# Patient Record
Sex: FEMALE | Race: BLACK OR AFRICAN AMERICAN | Employment: FULL TIME | URBAN - METROPOLITAN AREA
[De-identification: names, ages, dates, MRNs, and addresses within clinical notes are randomized per-mention and may not be internally consistent; named-entity substitution may affect disease eponyms.]

---

## 2019-08-27 ENCOUNTER — HOSPITAL ENCOUNTER (EMERGENCY)
Age: 39
Discharge: HOME | End: 2019-08-27
Attending: EMERGENCY MEDICINE
Payer: COMMERCIAL

## 2019-08-27 VITALS
WEIGHT: 189 LBS | OXYGEN SATURATION: 100 % | SYSTOLIC BLOOD PRESSURE: 127 MMHG | TEMPERATURE: 97.8 F | DIASTOLIC BLOOD PRESSURE: 73 MMHG | BODY MASS INDEX: 31.49 KG/M2 | HEIGHT: 65 IN | RESPIRATION RATE: 17 BRPM | HEART RATE: 72 BPM

## 2019-08-27 DIAGNOSIS — R53.83 OTHER FATIGUE: Primary | ICD-10-CM

## 2019-08-27 LAB
ANION GAP SERPL CALCULATED.3IONS-SCNC: 12 MMOL/L (ref 3–16)
BASOPHILS ABSOLUTE: 0.1 K/UL (ref 0–0.2)
BASOPHILS RELATIVE PERCENT: 1.4 %
BILIRUBIN URINE: NEGATIVE
BLOOD, URINE: NEGATIVE
BUN BLDV-MCNC: 8 MG/DL (ref 7–20)
CALCIUM SERPL-MCNC: 10.4 MG/DL (ref 8.3–10.6)
CHLORIDE BLD-SCNC: 100 MMOL/L (ref 99–110)
CLARITY: CLEAR
CO2: 26 MMOL/L (ref 21–32)
COLOR: YELLOW
CREAT SERPL-MCNC: 0.8 MG/DL (ref 0.6–1.1)
EOSINOPHILS ABSOLUTE: 0.1 K/UL (ref 0–0.6)
EOSINOPHILS RELATIVE PERCENT: 1.2 %
GFR AFRICAN AMERICAN: >60
GFR NON-AFRICAN AMERICAN: >60
GLUCOSE BLD-MCNC: 103 MG/DL (ref 70–99)
GLUCOSE URINE: NEGATIVE MG/DL
HCG(URINE) PREGNANCY TEST: NEGATIVE
HCT VFR BLD CALC: 38 % (ref 36–48)
HEMOGLOBIN: 12.5 G/DL (ref 12–16)
KETONES, URINE: ABNORMAL MG/DL
LEUKOCYTE ESTERASE, URINE: NEGATIVE
LYMPHOCYTES ABSOLUTE: 2 K/UL (ref 1–5.1)
LYMPHOCYTES RELATIVE PERCENT: 29.5 %
MCH RBC QN AUTO: 28.7 PG (ref 26–34)
MCHC RBC AUTO-ENTMCNC: 33 G/DL (ref 31–36)
MCV RBC AUTO: 87 FL (ref 80–100)
MICROSCOPIC EXAMINATION: ABNORMAL
MONOCYTES ABSOLUTE: 0.4 K/UL (ref 0–1.3)
MONOCYTES RELATIVE PERCENT: 5.3 %
NEUTROPHILS ABSOLUTE: 4.3 K/UL (ref 1.7–7.7)
NEUTROPHILS RELATIVE PERCENT: 62.6 %
NITRITE, URINE: NEGATIVE
PDW BLD-RTO: 15 % (ref 12.4–15.4)
PH UA: 7 (ref 5–8)
PLATELET # BLD: 289 K/UL (ref 135–450)
PMV BLD AUTO: 9.6 FL (ref 5–10.5)
POTASSIUM REFLEX MAGNESIUM: 4 MMOL/L (ref 3.5–5.1)
PROTEIN UA: NEGATIVE MG/DL
RBC # BLD: 4.36 M/UL (ref 4–5.2)
SODIUM BLD-SCNC: 138 MMOL/L (ref 136–145)
SPECIFIC GRAVITY UA: 1.02 (ref 1–1.03)
URINE REFLEX TO CULTURE: ABNORMAL
URINE TYPE: ABNORMAL
UROBILINOGEN, URINE: 0.2 E.U./DL
WBC # BLD: 6.9 K/UL (ref 4–11)

## 2019-08-27 PROCEDURE — 80048 BASIC METABOLIC PNL TOTAL CA: CPT

## 2019-08-27 PROCEDURE — 99284 EMERGENCY DEPT VISIT MOD MDM: CPT

## 2019-08-27 PROCEDURE — 85025 COMPLETE CBC W/AUTO DIFF WBC: CPT

## 2019-08-27 PROCEDURE — 84443 ASSAY THYROID STIM HORMONE: CPT

## 2019-08-27 PROCEDURE — 84436 ASSAY OF TOTAL THYROXINE: CPT

## 2019-08-27 PROCEDURE — 81003 URINALYSIS AUTO W/O SCOPE: CPT

## 2019-08-27 PROCEDURE — 84703 CHORIONIC GONADOTROPIN ASSAY: CPT

## 2019-08-27 PROCEDURE — 2580000003 HC RX 258: Performed by: EMERGENCY MEDICINE

## 2019-08-27 RX ORDER — SODIUM CHLORIDE, SODIUM LACTATE, POTASSIUM CHLORIDE, CALCIUM CHLORIDE 600; 310; 30; 20 MG/100ML; MG/100ML; MG/100ML; MG/100ML
1000 INJECTION, SOLUTION INTRAVENOUS ONCE
Status: COMPLETED | OUTPATIENT
Start: 2019-08-27 | End: 2019-08-27

## 2019-08-27 RX ADMIN — SODIUM CHLORIDE, POTASSIUM CHLORIDE, SODIUM LACTATE AND CALCIUM CHLORIDE 1000 ML: 600; 310; 30; 20 INJECTION, SOLUTION INTRAVENOUS at 21:13

## 2019-08-28 LAB
T4 TOTAL: 9 UG/DL (ref 4.5–10.9)
TSH SERPL DL<=0.05 MIU/L-ACNC: 1.28 UIU/ML (ref 0.27–4.2)

## 2019-08-28 NOTE — ED PROVIDER NOTES
[address] [facility] 70 ENCOUNTER          ATTENDING PHYSICIAN NOTE       Date of evaluation: 2019    Chief Complaint     Fatigue (c/o generalized fatigue since given Flagyl 2 weeks ago for vaginal infection, poor po intake, denies n/v/d or fevers)    History of Present Illness     Atiya Copeland is a 35 y.o. female who presents today with 2 weeks of generalized fatigue and generalized weakness. States she was diagnosed with bacterial vaginosis 2 weeks ago and took a 7-day course of Flagyl. Ever since starting to the 2 take the medication she has had decreased energy, decreased appetite. States she has been sleeping more. Denies any weight loss. Mild nausea, no abdominal pain no emesis. No diarrhea, no fevers. No other symptoms with this. Denies any depression or suicidal or homicidal ideation. Review of Systems     Review of Systems  A full 10 point review of systems was obtained. Pertinent positives and negatives as documented in the HPI, otherwise all other systems were reviewed and were negative. Past Medical, Surgical, Family, and Social History     She has a past medical history of Bacterial vaginosis. She has a past surgical history that includes Tubal ligation and  section. Her family history is not on file. She reports that she has never smoked. She has never used smokeless tobacco. She reports that she drinks alcohol. She reports that she does not use drugs. Medications     Previous Medications    No medications on file       Allergies     She is allergic to codeine.     Physical Exam     INITIAL VITALS: BP: (!) 165/91, Temp: 97.8 °F (36.6 °C), Pulse: 72, Resp: 17, SpO2: 99 %    Physical Exam  General: Well appearing in NAD  HEENT:  head is atraumatic, sclera are clear, oropharynx is nonerythematous, mucus membranes are moist  Neck: Trachea midline  Chest: Nonlabored respirations, clear to auscultation bilaterally  Cardiovascular: Regular rate and

## 2020-06-26 ENCOUNTER — OFFICE VISIT (OUTPATIENT)
Dept: PRIMARY CARE CLINIC | Age: 40
End: 2020-06-26
Payer: COMMERCIAL

## 2020-06-26 PROCEDURE — 99213 OFFICE O/P EST LOW 20 MIN: CPT | Performed by: NURSE PRACTITIONER

## 2020-06-26 NOTE — PROGRESS NOTES
FLU/COVID-19 CLINIC EVALUATION  HPI: Patient is in office today with concern for a known close exposure to COVID-19 Virus. The patient is requesting screening evaluation and COVID-19 testing. Symptom duration, days:  [] 1   [] 2    []3   [] 4    []5    []6   [] 7    []8    []9   [] 10    []11 []  12   [] 13    []14 or greater    There were no vitals filed for this visit. ROS:  All systems reviewed and are negative unless marked.    Constitutional: []Fevers []Chills   HENT: []Nasal Congestion []Loss of taste/smell []Sore throat   Respiratory: []Cough []Chest Congestion []Chest Congestion []Feeling short of breath  Cardiovascular: []Chest pain/heaviness  Gastrointestinal: []Nausea []Vomiting []Diarrhea  Musculoskeletal: []Muscle aches [] Fatigue   Neurological: []Headaches    OTHER SYMPTOMS:      Past Medical History:   Diagnosis Date    Bacterial vaginosis 08/2019     Social History     Socioeconomic History    Marital status: Single     Spouse name: Not on file    Number of children: Not on file    Years of education: Not on file    Highest education level: Not on file   Occupational History    Not on file   Social Needs    Financial resource strain: Not on file    Food insecurity     Worry: Not on file     Inability: Not on file    Transportation needs     Medical: Not on file     Non-medical: Not on file   Tobacco Use    Smoking status: Never Smoker    Smokeless tobacco: Never Used   Substance and Sexual Activity    Alcohol use: Yes     Comment: occ    Drug use: No    Sexual activity: Not Currently     Partners: Male   Lifestyle    Physical activity     Days per week: Not on file     Minutes per session: Not on file    Stress: Not on file   Relationships    Social connections     Talks on phone: Not on file     Gets together: Not on file     Attends Druze service: Not on file     Active member of club or organization: Not on file     Attends meetings of clubs or organizations: Not on

## 2020-06-30 LAB
SARS-COV-2: NOT DETECTED
SOURCE: NORMAL

## 2020-07-13 ENCOUNTER — OFFICE VISIT (OUTPATIENT)
Dept: PRIMARY CARE CLINIC | Age: 40
End: 2020-07-13
Payer: COMMERCIAL

## 2020-07-13 PROCEDURE — 99211 OFF/OP EST MAY X REQ PHY/QHP: CPT | Performed by: NURSE PRACTITIONER

## 2020-07-13 NOTE — PROGRESS NOTES
Willie Vanegas received a viral test for COVID-19. They were educated on isolation and quarantine as appropriate. For any symptoms, they were directed to seek care from their PCP, given contact information to establish with a doctor, directed to an urgent care or the emergency room.

## 2020-07-17 LAB
SARS-COV-2: DETECTED
SOURCE: ABNORMAL

## 2022-09-19 ENCOUNTER — HOSPITAL ENCOUNTER (INPATIENT)
Age: 42
LOS: 2 days | Discharge: HOME | DRG: 093 | End: 2022-09-21
Attending: EMERGENCY MEDICINE | Admitting: INTERNAL MEDICINE
Payer: COMMERCIAL

## 2022-09-19 ENCOUNTER — APPOINTMENT (OUTPATIENT)
Dept: CT IMAGING | Age: 42
DRG: 093 | End: 2022-09-19
Payer: COMMERCIAL

## 2022-09-19 ENCOUNTER — APPOINTMENT (OUTPATIENT)
Dept: GENERAL RADIOLOGY | Age: 42
DRG: 093 | End: 2022-09-19
Payer: COMMERCIAL

## 2022-09-19 DIAGNOSIS — G08 DURAL VENOUS SINUS THROMBOSIS: Primary | ICD-10-CM

## 2022-09-19 PROBLEM — I82.90 VENOUS THROMBOSIS: Status: ACTIVE | Noted: 2022-09-19

## 2022-09-19 LAB
A/G RATIO: 1 (ref 1.1–2.2)
ALBUMIN SERPL-MCNC: 3.9 G/DL (ref 3.4–5)
ALP BLD-CCNC: 65 U/L (ref 40–129)
ALT SERPL-CCNC: 12 U/L (ref 10–40)
ANION GAP SERPL CALCULATED.3IONS-SCNC: 9 MMOL/L (ref 3–16)
ANTI-XA UNFRAC HEPARIN: 0.13 IU/ML (ref 0.3–0.7)
AST SERPL-CCNC: 14 U/L (ref 15–37)
BASOPHILS ABSOLUTE: 0 K/UL (ref 0–0.2)
BASOPHILS RELATIVE PERCENT: 0.3 %
BILIRUB SERPL-MCNC: 0.3 MG/DL (ref 0–1)
BUN BLDV-MCNC: 7 MG/DL (ref 7–20)
CALCIUM SERPL-MCNC: 9.4 MG/DL (ref 8.3–10.6)
CHLORIDE BLD-SCNC: 104 MMOL/L (ref 99–110)
CO2: 24 MMOL/L (ref 21–32)
CREAT SERPL-MCNC: <0.5 MG/DL (ref 0.6–1.1)
EKG ATRIAL RATE: 53 BPM
EKG DIAGNOSIS: NORMAL
EKG P AXIS: 46 DEGREES
EKG P-R INTERVAL: 140 MS
EKG Q-T INTERVAL: 438 MS
EKG QRS DURATION: 88 MS
EKG QTC CALCULATION (BAZETT): 410 MS
EKG R AXIS: -15 DEGREES
EKG T AXIS: 6 DEGREES
EKG VENTRICULAR RATE: 53 BPM
EOSINOPHILS ABSOLUTE: 0.1 K/UL (ref 0–0.6)
EOSINOPHILS RELATIVE PERCENT: 0.6 %
GFR AFRICAN AMERICAN: >60
GFR NON-AFRICAN AMERICAN: >60
GLUCOSE BLD-MCNC: 110 MG/DL (ref 70–99)
HCG QUALITATIVE: NEGATIVE
HCT VFR BLD CALC: 36.7 % (ref 36–48)
HEMOGLOBIN: 12.1 G/DL (ref 12–16)
LYMPHOCYTES ABSOLUTE: 2.1 K/UL (ref 1–5.1)
LYMPHOCYTES RELATIVE PERCENT: 21 %
MCH RBC QN AUTO: 28.2 PG (ref 26–34)
MCHC RBC AUTO-ENTMCNC: 33 G/DL (ref 31–36)
MCV RBC AUTO: 85.4 FL (ref 80–100)
MONOCYTES ABSOLUTE: 0.4 K/UL (ref 0–1.3)
MONOCYTES RELATIVE PERCENT: 4.4 %
NEUTROPHILS ABSOLUTE: 7.3 K/UL (ref 1.7–7.7)
NEUTROPHILS RELATIVE PERCENT: 73.7 %
PDW BLD-RTO: 14 % (ref 12.4–15.4)
PLATELET # BLD: 271 K/UL (ref 135–450)
PMV BLD AUTO: 10 FL (ref 5–10.5)
POTASSIUM SERPL-SCNC: 4.3 MMOL/L (ref 3.5–5.1)
RBC # BLD: 4.29 M/UL (ref 4–5.2)
SARS-COV-2, NAAT: NOT DETECTED
SODIUM BLD-SCNC: 137 MMOL/L (ref 136–145)
TOTAL PROTEIN: 7.8 G/DL (ref 6.4–8.2)
TROPONIN: <0.01 NG/ML
WBC # BLD: 9.9 K/UL (ref 4–11)

## 2022-09-19 PROCEDURE — 93010 ELECTROCARDIOGRAM REPORT: CPT | Performed by: INTERNAL MEDICINE

## 2022-09-19 PROCEDURE — 2500000003 HC RX 250 WO HCPCS

## 2022-09-19 PROCEDURE — 85520 HEPARIN ASSAY: CPT

## 2022-09-19 PROCEDURE — 85025 COMPLETE CBC W/AUTO DIFF WBC: CPT

## 2022-09-19 PROCEDURE — 6360000004 HC RX CONTRAST MEDICATION: Performed by: EMERGENCY MEDICINE

## 2022-09-19 PROCEDURE — 96375 TX/PRO/DX INJ NEW DRUG ADDON: CPT

## 2022-09-19 PROCEDURE — 2000000000 HC ICU R&B

## 2022-09-19 PROCEDURE — 99223 1ST HOSP IP/OBS HIGH 75: CPT | Performed by: INTERNAL MEDICINE

## 2022-09-19 PROCEDURE — 71045 X-RAY EXAM CHEST 1 VIEW: CPT

## 2022-09-19 PROCEDURE — 87635 SARS-COV-2 COVID-19 AMP PRB: CPT

## 2022-09-19 PROCEDURE — 96365 THER/PROPH/DIAG IV INF INIT: CPT

## 2022-09-19 PROCEDURE — 2580000003 HC RX 258: Performed by: STUDENT IN AN ORGANIZED HEALTH CARE EDUCATION/TRAINING PROGRAM

## 2022-09-19 PROCEDURE — 70450 CT HEAD/BRAIN W/O DYE: CPT

## 2022-09-19 PROCEDURE — 2500000003 HC RX 250 WO HCPCS: Performed by: EMERGENCY MEDICINE

## 2022-09-19 PROCEDURE — 96366 THER/PROPH/DIAG IV INF ADDON: CPT

## 2022-09-19 PROCEDURE — 84484 ASSAY OF TROPONIN QUANT: CPT

## 2022-09-19 PROCEDURE — 70496 CT ANGIOGRAPHY HEAD: CPT

## 2022-09-19 PROCEDURE — 6360000002 HC RX W HCPCS: Performed by: EMERGENCY MEDICINE

## 2022-09-19 PROCEDURE — 6370000000 HC RX 637 (ALT 250 FOR IP): Performed by: STUDENT IN AN ORGANIZED HEALTH CARE EDUCATION/TRAINING PROGRAM

## 2022-09-19 PROCEDURE — 80053 COMPREHEN METABOLIC PANEL: CPT

## 2022-09-19 PROCEDURE — 99285 EMERGENCY DEPT VISIT HI MDM: CPT

## 2022-09-19 PROCEDURE — 93005 ELECTROCARDIOGRAM TRACING: CPT | Performed by: EMERGENCY MEDICINE

## 2022-09-19 PROCEDURE — 84703 CHORIONIC GONADOTROPIN ASSAY: CPT

## 2022-09-19 RX ORDER — ACETAMINOPHEN 325 MG/1
650 TABLET ORAL EVERY 6 HOURS PRN
Status: DISCONTINUED | OUTPATIENT
Start: 2022-09-19 | End: 2022-09-21 | Stop reason: HOSPADM

## 2022-09-19 RX ORDER — ONDANSETRON 4 MG/1
4 TABLET, ORALLY DISINTEGRATING ORAL EVERY 8 HOURS PRN
Status: DISCONTINUED | OUTPATIENT
Start: 2022-09-19 | End: 2022-09-21 | Stop reason: HOSPADM

## 2022-09-19 RX ORDER — POLYETHYLENE GLYCOL 3350 17 G/17G
17 POWDER, FOR SOLUTION ORAL DAILY PRN
Status: DISCONTINUED | OUTPATIENT
Start: 2022-09-19 | End: 2022-09-21 | Stop reason: HOSPADM

## 2022-09-19 RX ORDER — SODIUM CHLORIDE 0.9 % (FLUSH) 0.9 %
5-40 SYRINGE (ML) INJECTION EVERY 12 HOURS SCHEDULED
Status: DISCONTINUED | OUTPATIENT
Start: 2022-09-19 | End: 2022-09-21 | Stop reason: HOSPADM

## 2022-09-19 RX ORDER — LABETALOL HYDROCHLORIDE 5 MG/ML
INJECTION, SOLUTION INTRAVENOUS
Status: COMPLETED
Start: 2022-09-19 | End: 2022-09-19

## 2022-09-19 RX ORDER — SODIUM CHLORIDE 9 MG/ML
INJECTION, SOLUTION INTRAVENOUS PRN
Status: DISCONTINUED | OUTPATIENT
Start: 2022-09-19 | End: 2022-09-21 | Stop reason: HOSPADM

## 2022-09-19 RX ORDER — HYDRALAZINE HYDROCHLORIDE 20 MG/ML
10 INJECTION INTRAMUSCULAR; INTRAVENOUS EVERY 6 HOURS PRN
Status: DISCONTINUED | OUTPATIENT
Start: 2022-09-19 | End: 2022-09-21 | Stop reason: HOSPADM

## 2022-09-19 RX ORDER — LABETALOL HYDROCHLORIDE 5 MG/ML
10 INJECTION, SOLUTION INTRAVENOUS EVERY 4 HOURS PRN
Status: DISCONTINUED | OUTPATIENT
Start: 2022-09-19 | End: 2022-09-21 | Stop reason: HOSPADM

## 2022-09-19 RX ORDER — ACETAMINOPHEN 650 MG/1
650 SUPPOSITORY RECTAL EVERY 6 HOURS PRN
Status: DISCONTINUED | OUTPATIENT
Start: 2022-09-19 | End: 2022-09-21 | Stop reason: HOSPADM

## 2022-09-19 RX ORDER — SODIUM CHLORIDE 0.9 % (FLUSH) 0.9 %
5-40 SYRINGE (ML) INJECTION PRN
Status: DISCONTINUED | OUTPATIENT
Start: 2022-09-19 | End: 2022-09-21 | Stop reason: HOSPADM

## 2022-09-19 RX ORDER — LABETALOL HYDROCHLORIDE 5 MG/ML
5 INJECTION, SOLUTION INTRAVENOUS ONCE
Status: COMPLETED | OUTPATIENT
Start: 2022-09-19 | End: 2022-09-19

## 2022-09-19 RX ORDER — HEPARIN SODIUM 10000 [USP'U]/100ML
5-30 INJECTION, SOLUTION INTRAVENOUS CONTINUOUS
Status: DISCONTINUED | OUTPATIENT
Start: 2022-09-19 | End: 2022-09-21

## 2022-09-19 RX ORDER — ONDANSETRON 2 MG/ML
4 INJECTION INTRAMUSCULAR; INTRAVENOUS EVERY 6 HOURS PRN
Status: DISCONTINUED | OUTPATIENT
Start: 2022-09-19 | End: 2022-09-21 | Stop reason: HOSPADM

## 2022-09-19 RX ORDER — LABETALOL HYDROCHLORIDE 5 MG/ML
10 INJECTION, SOLUTION INTRAVENOUS ONCE
Status: DISCONTINUED | OUTPATIENT
Start: 2022-09-19 | End: 2022-09-19

## 2022-09-19 RX ADMIN — HEPARIN SODIUM AND DEXTROSE 12 UNITS/KG/HR: 10000; 5 INJECTION INTRAVENOUS at 15:07

## 2022-09-19 RX ADMIN — LABETALOL HYDROCHLORIDE 5 MG: 5 INJECTION, SOLUTION INTRAVENOUS at 17:20

## 2022-09-19 RX ADMIN — ACETAMINOPHEN 650 MG: 325 TABLET, FILM COATED ORAL at 18:57

## 2022-09-19 RX ADMIN — IOPAMIDOL 80 ML: 755 INJECTION, SOLUTION INTRAVENOUS at 13:07

## 2022-09-19 RX ADMIN — LABETALOL HYDROCHLORIDE 5 MG: 5 INJECTION, SOLUTION INTRAVENOUS at 15:47

## 2022-09-19 RX ADMIN — SODIUM CHLORIDE, PRESERVATIVE FREE 10 ML: 5 INJECTION INTRAVENOUS at 20:15

## 2022-09-19 ASSESSMENT — ENCOUNTER SYMPTOMS
NAUSEA: 1
FACIAL SWELLING: 0
SHORTNESS OF BREATH: 0
TROUBLE SWALLOWING: 0
GASTROINTESTINAL NEGATIVE: 1
ABDOMINAL PAIN: 0
RESPIRATORY NEGATIVE: 1
EYE REDNESS: 0
SINUS PRESSURE: 1
EYE ITCHING: 0
EYE PAIN: 0
DIARRHEA: 0
COUGH: 0
COLOR CHANGE: 0
CHEST TIGHTNESS: 0
SORE THROAT: 1
BACK PAIN: 0
EYE DISCHARGE: 0
PHOTOPHOBIA: 0
VOMITING: 0
SINUS PAIN: 0

## 2022-09-19 ASSESSMENT — VISUAL ACUITY
OD: 20/30
OS: 20/50
OU: 20/20

## 2022-09-19 ASSESSMENT — PAIN DESCRIPTION - DESCRIPTORS: DESCRIPTORS: ACHING

## 2022-09-19 ASSESSMENT — PAIN SCALES - GENERAL: PAINLEVEL_OUTOF10: 7

## 2022-09-19 ASSESSMENT — PAIN DESCRIPTION - LOCATION: LOCATION: HEAD

## 2022-09-19 ASSESSMENT — PAIN - FUNCTIONAL ASSESSMENT: PAIN_FUNCTIONAL_ASSESSMENT: NONE - DENIES PAIN

## 2022-09-19 NOTE — ED PROVIDER NOTES
EMERGENCY DEPARTMENT ENCOUNTER        Pt Name: Jessie Feldman  MRN: [id number]  Ana Mgfrosa 1980  Date of evaluation: 9/19/2022  Provider: Flory Venegas MD  PCP: No primary care provider on file. CHIEF COMPLAINT       Chief Complaint   Patient presents with    Blurred Vision     Pt c/o blurred vision to both eyes x past 2 weeks. States she had a headache for a week straight approx 1 month ago and when she checked her BP it was high (around 170/110). Pt states her BP then was reading normal. Blurry vision started after this. Also has been having cold symptoms recently. HISTORY OFPRESENT ILLNESS   (Location/Symptom, Timing/Onset, Context/Setting, Quality, Duration, Modifying Factors,Severity)  Note limiting factors. Jessie Feldman is a 36 y.o. female presenting today due to concern for having a headache roughly 1 month ago that lasted for a week associated with increased blood pressure that ultimately resolved and her blood pressure did seem to come back to normal as well following this but after the headache resolved she started having intermittent blurry vision over the last 2 to 3 weeks that waxes and wanes in severity and she is unsure what is causing it. She denies seeing an ophthalmologist in years and normally does not need contacts or glasses. Her vision was fine until a few weeks ago. She denies any headache or eye pain. She denies any pain with eye movements. She does have sinus congestion and drainage and was treated last week for possibility of strep throat with some antibiotic although she is unsure what it was. She also states that she was on steroids last week and did take a dose of steroids this morning. She normally does not need to take medication for blood pressure and states that she was told in the past it was elevated but ultimately lost some weight and following this her blood pressure went down to a normal level.   She tried to get into see an eye doctor today but states nobody was able to see her for weeks. She did try Sudafed for the congestion along with some type of cough medicine yesterday but denies taking that today. Her last menstrual cycle was September 10, 2022. She denies any abdominal pain or vomiting or diarrhea. She also complains of some right ear pain. She does complain of a mild sore throat as well although states it is better than last week. Due to concern for the persistent cold symptoms over the last few days but also increased blood pressure and vision changes, she came to the ED for further evaluation. She is on estrogen to help regulate her periods. She denies any leg swelling or history of blood clots. She denies any chest pain or shortness of breath or pain with breathing. REVIEW OF SYSTEMS    (2-9 systems for level 4, 10 or more for level 5)     Review of Systems   Constitutional:  Positive for fatigue. Negative for activity change, appetite change, diaphoresis and fever. HENT:  Positive for congestion, ear pain (right), sinus pressure and sore throat. Negative for dental problem, ear discharge, facial swelling, hearing loss, sinus pain and trouble swallowing. Eyes:  Positive for visual disturbance (both eyes). Negative for photophobia, pain, discharge, redness and itching. Respiratory:  Negative for cough, chest tightness and shortness of breath. Cardiovascular:  Negative for chest pain, palpitations and leg swelling. Gastrointestinal:  Positive for nausea. Negative for abdominal pain, diarrhea and vomiting. Genitourinary:  Negative for difficulty urinating, dysuria, flank pain and pelvic pain. Musculoskeletal:  Negative for back pain, gait problem, neck pain and neck stiffness. Skin:  Negative for color change and wound (denies any recent falls or trauma). Neurological:  Negative for dizziness, syncope, facial asymmetry, speech difficulty, weakness, light-headedness, numbness and headaches. Psychiatric/Behavioral:  Negative for confusion. The patient is nervous/anxious. Positives and Pertinent negatives as per HPI. PASTMEDICAL HISTORY     Past Medical History:   Diagnosis Date    Bacterial vaginosis 2019         SURGICAL HISTORY       Past Surgical History:   Procedure Laterality Date     SECTION      TUBAL LIGATION           CURRENT MEDICATIONS       Current Discharge Medication List        CONTINUE these medications which have NOT CHANGED    Details   norgestimate-ethinyl estradiol (SOFIA) 0.25-35 MG-MCG per tablet Take 1 tablet by mouth daily             ALLERGIES     Codeine    FAMILY HISTORY     History reviewed. No pertinent family history. SOCIAL HISTORY       Social History     Socioeconomic History    Marital status: Single     Spouse name: None    Number of children: None    Years of education: None    Highest education level: None   Tobacco Use    Smoking status: Never    Smokeless tobacco: Never   Substance and Sexual Activity    Alcohol use: Yes     Comment: occ    Drug use: No    Sexual activity: Not Currently     Partners: Male       SCREENINGS   NIH Stroke Scale  Interval: Reassessment  Level of Consciousness (1a): Alert  LOC Questions (1b): Answers both correctly  LOC Commands (1c): Performs both tasks correctly  Best Gaze (2): Normal  Visual (3): No visual loss  Facial Palsy (4): Normal symmetrical movement  Motor Arm, Left (5a): No drift  Motor Arm, Right (5b): No drift  Motor Leg, Left (6a): No drift  Motor Leg, Right (6b):  No drift  Limb Ataxia (7): Absent  Sensory (8): Normal (feels very slightly less sensation on right side limbs)  Best Language (9): No aphasia  Dysarthria (10): Normal  Extinction and Inattention (11): No abnormality  Total: 0Glasgow Coma Scale  Eye Opening: Spontaneous  Best Verbal Response: Oriented  Best Motor Response: Obeys commands  [place] Coma Scale Score: 15           PHYSICAL EXAM    (up to 7 for level 4, 8 or more for level 5)     ED Triage Vitals [09/19/22 1058]   BP Temp Temp Source Heart Rate Resp SpO2 Height Weight   (!) 172/115 97.8 °F (36.6 °C) Oral 71 16 99 % -- 165 lb (74.8 kg)       Physical Exam  Vitals and nursing note reviewed. Constitutional:       General: She is awake. She is not in acute distress. Appearance: Normal appearance. She is well-developed, well-groomed and overweight. She is not ill-appearing, toxic-appearing or diaphoretic. Interventions: She is not intubated. HENT:      Head: Normocephalic and atraumatic. No raccoon eyes, Reyes's sign, abrasion, contusion, masses, right periorbital erythema, left periorbital erythema or laceration. Hair is normal.      Jaw: There is normal jaw occlusion. No trismus, tenderness, swelling or pain on movement. Right Ear: External ear normal. No laceration, drainage, swelling or tenderness. A middle ear effusion is present. There is no impacted cerumen. No mastoid tenderness. No hemotympanum. Tympanic membrane is injected and bulging. Left Ear: External ear normal. No laceration, drainage, swelling or tenderness. A middle ear effusion is present. There is no impacted cerumen. No mastoid tenderness. No hemotympanum. Tympanic membrane is injected and bulging. Nose: Mucosal edema and congestion present. No signs of injury, laceration, nasal tenderness or rhinorrhea. Right Nostril: No epistaxis or septal hematoma. Left Nostril: No epistaxis or septal hematoma. Right Sinus: Maxillary sinus tenderness present. No frontal sinus tenderness. Left Sinus: No maxillary sinus tenderness or frontal sinus tenderness. Mouth/Throat:      Lips: Pink. No lesions. Mouth: Mucous membranes are moist. No injury, lacerations, oral lesions or angioedema. Tongue: No lesions. Tongue does not deviate from midline. Palate: No mass and lesions. Pharynx: Oropharynx is clear. Posterior oropharyngeal erythema present.       Tonsils: No tonsillar exudate. 3+ on the right. 1+ on the left. Eyes:      General: Lids are normal.         Right eye: No discharge. Left eye: No discharge. Extraocular Movements: Extraocular movements intact. Right eye: Normal extraocular motion and no nystagmus. Left eye: Normal extraocular motion and no nystagmus. Conjunctiva/sclera: Conjunctivae normal.      Pupils: Pupils are equal, round, and reactive to light. Pupils are equal.      Right eye: Pupil is round, reactive and not sluggish. Left eye: Pupil is round, reactive and not sluggish. Slit lamp exam:     Right eye: No photophobia. Left eye: No photophobia. Comments: OD = 20/30  OS = 20/50   OU = 20/20    No corrective lenses    Neck:      Vascular: No JVD. Trachea: Trachea and phonation normal. No tracheal tenderness or tracheal deviation. Comments: No nuchal rigidity  Cardiovascular:      Rate and Rhythm: Normal rate and regular rhythm. Pulses: Normal pulses. Radial pulses are 2+ on the right side and 2+ on the left side. Heart sounds: No murmur heard. Pulmonary:      Effort: Pulmonary effort is normal. No tachypnea, bradypnea, accessory muscle usage, prolonged expiration, respiratory distress or retractions. She is not intubated. Breath sounds: Normal breath sounds and air entry. No stridor. No decreased breath sounds, wheezing, rhonchi or rales. Chest:      Chest wall: No tenderness. Abdominal:      General: Abdomen is flat. Bowel sounds are normal. There is no distension. Palpations: Abdomen is soft. Abdomen is not rigid. Tenderness: There is no abdominal tenderness. There is no guarding or rebound. Negative signs include Choi's sign and McBurney's sign. Musculoskeletal:         General: No swelling, tenderness, deformity or signs of injury. Normal range of motion.       Cervical back: Full passive range of motion without pain, normal range of motion and neck supple. No edema, erythema, signs of trauma, rigidity, torticollis, tenderness or crepitus. No pain with movement, spinous process tenderness or muscular tenderness. Normal range of motion. Right lower leg: No edema. Left lower leg: No edema. Lymphadenopathy:      Head:      Right side of head: Tonsillar adenopathy present. No submental, submandibular, preauricular, posterior auricular or occipital adenopathy. Left side of head: No submental, submandibular, tonsillar, preauricular, posterior auricular or occipital adenopathy. Cervical: No cervical adenopathy. Right cervical: No superficial cervical adenopathy. Left cervical: No superficial cervical adenopathy. Skin:     General: Skin is warm and dry. Coloration: Skin is not jaundiced or pale. Findings: No erythema or rash. Neurological:      General: No focal deficit present. Mental Status: She is alert and oriented to person, place, and time. Mental status is at baseline. GCS: GCS eye subscore is 4. GCS verbal subscore is 5. GCS motor subscore is 6. Cranial Nerves: Cranial nerves are intact. No cranial nerve deficit, dysarthria or facial asymmetry. Sensory: Sensation is intact. No sensory deficit. Motor: Motor function is intact. No weakness, tremor, atrophy, abnormal muscle tone, seizure activity or pronator drift. Gait: Gait is intact. Gait normal.   Psychiatric:         Mood and Affect: Mood normal.         Behavior: Behavior normal. Behavior is cooperative.            DIAGNOSTIC RESULTS   :    Labs Reviewed   COMPREHENSIVE METABOLIC PANEL - Abnormal; Notable for the following components:       Result Value    Glucose 110 (*)     Creatinine <0.5 (*)     Albumin/Globulin Ratio 1.0 (*)     AST 14 (*)     All other components within normal limits   ANTI-XA, UNFRACTIONATED HEPARIN - Abnormal; Notable for the following components:    Anti-XA Unfrac Heparin 0.13 (*)     All other components within normal limits   BASIC METABOLIC PANEL W/ REFLEX TO MG FOR LOW K - Abnormal; Notable for the following components:    Glucose 105 (*)     All other components within normal limits   CBC WITH AUTO DIFFERENTIAL - Abnormal; Notable for the following components:    WBC 11.3 (*)     Hemoglobin 11.6 (*)     Hematocrit 35.0 (*)     All other components within normal limits   ANTI-XA, UNFRACTIONATED HEPARIN - Abnormal; Notable for the following components:    Anti-XA Unfrac Heparin 0.25 (*)     All other components within normal limits   BASIC METABOLIC PANEL W/ REFLEX TO MG FOR LOW K - Abnormal; Notable for the following components:    Glucose 107 (*)     All other components within normal limits   CBC WITH AUTO DIFFERENTIAL - Abnormal; Notable for the following components:    WBC 12.1 (*)     All other components within normal limits   ANTI-XA, UNFRACTIONATED HEPARIN - Abnormal; Notable for the following components:    Anti-XA Unfrac Heparin >1.10 (*)     All other components within normal limits    Narrative:     CALL  [address] tel. [phone],  Coag results called to and read back by Ana Flaherty, 09/21/2022 05:38, by  VALERY   ANTI-XA, UNFRACTIONATED HEPARIN - Abnormal; Notable for the following components:    Anti-XA Unfrac Heparin >1.10 (*)     All other components within normal limits    Narrative:     CALL  [address] tel. [phone],  Coag results called to and read back by danna poe. , 09/21/2022 06:17, by  Cris PINK-[email or web address], RAPID   CBC WITH AUTO DIFFERENTIAL   TROPONIN   HCG, SERUM, QUALITATIVE   ANTI-XA, UNFRACTIONATED HEPARIN   ANTI-XA, UNFRACTIONATED HEPARIN   ANTI-XA, UNFRACTIONATED HEPARIN   SPECIMEN REJECTION    Narrative:     Kaleb Lara tel. [phone],  Rejected Test Name/Called to: petr ahumada/icu, 09/21/2022 13:09, by Lizzie Viveros       All other labs were within normal range or not returned asof this dictation. EKG:  All EKG's are interpreted by the Emergency Department Physician who either signs or Co-signs this chart in the absence of a cardiologist.    The Ekg interpreted by me shows  sinus bradycardia, rate=53    Axis is   Normal  QTc is  normal  Intervals and Durations are unremarkable. ST Segments: normal  No significant change from prior EKG dated - no old EKG  No STEMI         RADIOLOGY:   Non-plain film images such as CT, Ultrasound and MRI are read by the radiologist. Clenton Dano images are visualized and preliminarily interpreted by the  ED Provider with the belowfindings:        Interpretation per the Radiologist below, if available at the time of this note:    CTA VENOUS HEAD W WO CONTRAST   Final Result      Stable linear subocclusive filling defect in the left transverse and sigmoid sinuses, consistent with known dural venous sinus thrombosis. MRI BRAIN WO CONTRAST   Final Result      1. Faint signal abnormality along the left sigmoid sinus corresponding to previous CT finding. Findings suggest a small subocclusive thrombus as seen on prior CT. No acute intracranial abnormality otherwise identified. VL Extremity Venous Bilateral   Final Result      CT HEAD WO CONTRAST   Final Result      No acute intracranial hemorrhage or mass effect. XR CHEST PORTABLE   Final Result      No acute radiographic abnormality of the chest.      CTA HEAD NECK W CONTRAST   Final Result      CTA Head:   Findings suspicious for subocclusive dural venous sinus thrombosis extending from the left transverse sinus into the sigmoid sinus and potentially upper internal jugular vein. No arterial steno-occlusive disease or aneurysm. CTA Neck:   No arterial steno-occlusive disease or dissection.                PROCEDURES   Unless otherwise noted below, none     Procedures    CRITICAL CARE TIME   Critical Care Time:    I personally saw the patient and independently provided 60 minutes of non-concurrent critical care out of a total shared critical care time provided. Includes repeat examinations, speaking with consultants, lab  interpretation, charting, treating for dural venous thrombosis needing heparin and IV blood pressure management due to concerning pathology   Excludes separate billable procedures. Patient at risk for serious decompensation if not treated for this life-threatening condition. CONSULTS: Spoke with NP Carlos related to dural venous thrombosis and he states that the blood pressure should be less than 906 systolically and otherwise start the patient on a low-dose heparin drip and otherwise she will need to be admitted to the ICU for close observation over the next couple of days to ensure it does not propagate.   Spoke with Dr. Torres Jaimes at [phone] and he did accept the patient for admission at [address] with critical care specialist.    IP CONSULT TO NEUROSURGERY  IP CONSULT TO HOSPITALIST  IP CONSULT TO CRITICAL CARE  IP CONSULT TO NEUROLOGY  IP CONSULT TO [employer] [facility] and DIFFERENTIAL DIAGNOSIS/MDM:   Vitals:    Vitals:    09/21/22 1300 09/21/22 1400 09/21/22 1500 09/21/22 1600   BP: 121/76 110/66 123/86 (!) 134/90   Pulse: 73 81 84 71   Resp: 13 18 15 15   Temp:    97.8 °F (36.6 °C)   TempSrc:    Oral   SpO2:    97%   Weight:           Patient was given the following medications:  Medications   sodium chloride flush 0.9 % injection 5-40 mL (10 mLs IntraVENous Given 9/21/22 0738)   sodium chloride flush 0.9 % injection 5-40 mL (has no administration in time range)   0.9 % sodium chloride infusion (has no administration in time range)   ondansetron (ZOFRAN-ODT) disintegrating tablet 4 mg (has no administration in time range)     Or   ondansetron (ZOFRAN) injection 4 mg (has no administration in time range)   polyethylene glycol (GLYCOLAX) packet 17 g (has no administration in time range)   acetaminophen (TYLENOL) tablet 650 mg (650 mg Oral Given 9/21/22 7241)     Or   acetaminophen (TYLENOL) suppository 650 mg ( Rectal See Alternative 9/21/22 0624)   labetalol (NORMODYNE;TRANDATE) injection 10 mg (has no administration in time range)   hydrALAZINE (APRESOLINE) injection 10 mg (has no administration in time range)   dabigatran (PRADAXA) capsule 150 mg (150 mg Oral Given 9/21/22 1437)   iopamidol (ISOVUE-370) 76 % injection 80 mL (80 mLs IntraVENous Given 9/19/22 1307)   labetalol (NORMODYNE;TRANDATE) injection 5 mg (5 mg IntraVENous Given 9/19/22 1547)   labetalol (NORMODYNE;TRANDATE) injection 5 mg (5 mg IntraVENous Given 9/19/22 1720)   midazolam (VERSED) injection 0.25 mg (0.25 mg IntraVENous Given 9/20/22 2140)   iopamidol (ISOVUE-370) 76 % injection 75 mL (75 mLs IntraVENous Given 9/21/22 1516)     Patient was evaluated due to concern for intermittent blurry vision over the last couple of weeks although she did go to bed with blurry vision last night and woke up with it being slightly worse and it has been going on for at least the last 24 hours. She denies any headache or falls or trauma. She had no other focal neurological concerns on exam and ambulated without any difficulty with no dizziness or lightheadedness. She denies any chest pain or shortness of breath and story not suggestive of acute coronary syndrome or pulmonary embolism. Due to concern for blurry vision and having initially significantly elevated blood pressure, we did obtain a CT of the head and CTA of the head and neck to evaluate for any concern for atherosclerosis or other serious pathology that could cause blurry vision. Story not suggestive of meningitis or subarachnoid hemorrhage. She is on birth control and therefore I did consider dural venous thrombosis. Ultimately, her CT did come back positive for this and therefore I did speak to neurosurgery who recommended admission to the ICU and be placed on heparin drip but no need for bolus at this point. She did require some IV labetalol due to elevated blood pressure. Her blurry vision is most likely related to her dural venous thrombosis and neurosurgery did not feel that calling ophthalmology would be needed at this point and that her symptoms should resolve as the dural venous thrombosis resolves. They stated that she will follow-up with ophthalmology as an outpatient if needed. She was well-appearing and in no acute distress at time of admission and agreed with this plan. The patient tolerated their visit well. The patient and / or the family were informed of the results of any tests, a time was given to answer questions. FINAL IMPRESSION      1.  Dural venous sinus thrombosis          DISPOSITION/PLAN   DISPOSITION Decision To Admit 09/19/2022 02:45:06 PM      PATIENT REFERRED TO:  Yoan Conn - Neurology  [place] [address]  [phone]  Schedule an appointment as soon as possible for a visit in 3 week(s)      Lizz Mir MD  [address] [place] [address]  [phone]    Schedule an appointment as soon as possible for a visit in 2 week(s)  to find out reason for clotting    DISCHARGEMEDICATIONS:  Current Discharge Medication List        START taking these medications    Details   dabigatran (PRADAXA) 150 MG capsule Take 1 capsule by mouth 2 times daily  Qty: 60 capsule, Refills: 0             DISCONTINUED MEDICATIONS:  Current Discharge Medication List                 (Please note that portions of this note were completed with a voicerecognition program.  Efforts were made to edit the dictations but occasionally words are mis-transcribed.)    Erica Luevano MD (electronically signed)            Erica Luevano MD  09/21/22 0030 Lamine Silverman MD  09/21/22 2984

## 2022-09-19 NOTE — CONSULTS
ICU [place] Day: 1  ICU Day: 1                                                           Code: Full Code  Admit Date: 9/19/2022    PCP: No primary care provider on file. CC: Headache, visual abnormalities. HISTORY OF PRESENT ILLNESS:     Marianne Blancas is a 14-year-old F with no PMHx who presented to [facility] ED with complaints of headache and visual difficulties. Patient endorses that she began to have a headache the week of August 13-14, she described this as a dull pressure-like headache at the top and back of her head. She noted it was associated with elevated BP into 936'N systolic. The headache lasted for approximately 1 week then resolved spontaneously. Over the last 2-3 weeks she endorses resurgence of the headache and association with new-onset visual abnormalities. She endorses she feels her visual acuity is not compromised but that she has to strain more than usual to focus her vision. She does endorse some discomfort with looking to the R during this time but otherwise denies any associated visual abnormalities including redness, photophobia, or pain with any other eye movements. She also endorses having cold-like symptoms over the last 2-3 weeks, including sinus pressure and cough, sore throat as well as swollen lymph nodes in her R neck. She used several OTC regimens for her cough including Sudafed and Mg for her cold. She also took some pills of Prednisone, unknown dosage, sporadically between the Wednesday prior to admission and the morning of admission. Her only home medication is Estrogen. Also of note she had Covid-19 infection Dec 2021. Of note there is no personal or family history of clotting abnormalities. Of note she had similar headaches approximately 1 year ago, she states she weighed 190 lbs at the time and when she saw PCP she was told to reduce weight prior to medical management of her high BP which she successfully did.  Of note last outpatient visit was with  in  /85 and other vital signs stable. On presentation to outside ED, VS significant for 'P systolic other VSS. CTA head in outside ED with findings suspicious for subocclusive dural venous sinus thrombosis extending from the left transverse sinus into the sigmoid sinus and potentially upper internal jugular vein. She was transferred to [facility] for ICU care and NSGY evaluation recommended. On arrival to [facility], vital signs with /90's, other VSS. On arrival she endorsed feeling well overall,  had a mild headache at back of head but also states she has been crying all day. She endorsed it as a pressure-like sensation, 5/10 pain. She also notices having to strain for visual acuity. On PE she has some discomfort with R sided eye movements in R eye but visual acuity and fields largely intact. Otherwise no remarkable findings on PE. Labetalol 5 mg x1 given to keep SBP <160. She was admitted to the ICU for further management. PAST HISTORY:     Past Medical History:   Diagnosis Date    Bacterial vaginosis 2019     Past Surgical History:   Procedure Laterality Date     SECTION      TUBAL LIGATION       Social History:   The patient lives at home. Alcohol: Occasional use. Illicit drugs: Denies use. Tobacco: Does smoke hookah \"once in a blue rosales\". Family History:  History reviewed. No pertinent family history. MEDICATIONS:     No current facility-administered medications on file prior to encounter. Current Outpatient Medications on File Prior to Encounter   Medication Sig Dispense Refill    NONFORMULARY Oral BCP       Scheduled Meds:   sodium chloride flush  5-40 mL IntraVENous 2 times per day      Continuous Infusions:   heparin (PORCINE) Infusion 12 Units/kg/hr (22 5142)    sodium chloride       PRN Meds:    Allergies:    Allergies   Allergen Reactions    Codeine Hives     REVIEW OF SYSTEMS:       History obtained from the patient. Review of Systems  Per Interval hx. PHYSICAL EXAM:       Vitals: BP (!) 141/87   Pulse 69   Temp 98.1 °F (36.7 °C) (Oral)   Resp 13   Wt 165 lb (74.8 kg)   LMP 09/10/2022   SpO2 96%   BMI 27.46 kg/m²     I/O:  No intake or output data in the 24 hours ending 09/19/22 1802  No intake/output data recorded. No intake/output data recorded. Physical Examination:   Physical Exam  HENT:      Head: Normocephalic and atraumatic. Eyes:      Extraocular Movements: Extraocular movements intact. Pupils: Pupils are equal, round, and reactive to light. Comments: Visual acuity intact. Questionable R superior temporal visual field deficit. All other visual fields intact. Cardiovascular:      Rate and Rhythm: Normal rate and regular rhythm. Pulses: Normal pulses. Heart sounds: Normal heart sounds. Pulmonary:      Effort: Pulmonary effort is normal.      Breath sounds: Normal breath sounds. Abdominal:      General: Abdomen is flat. Palpations: Abdomen is soft. Musculoskeletal:         General: Normal range of motion. Skin:     General: Skin is warm and dry. Capillary Refill: Capillary refill takes less than 2 seconds. Neurological:      Mental Status: She is alert and oriented to person, place, and time. Mental status is at baseline. DATA:       Labs:  CBC:   Recent Labs     09/19/22  1226   WBC 9.9   HGB 12.1   HCT 36.7          BMP:   Recent Labs     09/19/22  1226      K 4.3      CO2 24   BUN 7   CREATININE <0.5*   GLUCOSE 110*     LFT's:   Recent Labs     09/19/22  1226   AST 14*   ALT 12   BILITOT 0.3   ALKPHOS 65     Troponin:   Recent Labs     09/19/22  1226   TROPONINI <0.01     BNP:No results for input(s): BNP in the last 72 hours. ABGs: No results for input(s): PHART, CBF4CCM, PO2ART in the last 72 hours. INR: No results for input(s): INR in the last 72 hours.     U/A:No results for input(s): NITRITE, COLORU, PHUR, LABCAST, Caleb Cheney, MUCUS, TRICHOMONAS, YEAST, BACTERIA, CLARITYU, SPECGRAV, LEUKOCYTESUR, UROBILINOGEN, BILIRUBINUR, BLOODU, GLUCOSEU, AMORPHOUS in the last 72 hours. Invalid input(s): KETONESU    CT HEAD WO CONTRAST   Final Result      No acute intracranial hemorrhage or mass effect. XR CHEST PORTABLE   Final Result      No acute radiographic abnormality of the chest.      CTA HEAD NECK W CONTRAST   Final Result      CTA Head:   Findings suspicious for subocclusive dural venous sinus thrombosis extending from the left transverse sinus into the sigmoid sinus and potentially upper internal jugular vein. No arterial steno-occlusive disease or aneurysm. CTA Neck:   No arterial steno-occlusive disease or dissection. VL Extremity Venous Bilateral    (Results Pending)     ASSESSMENT AND PLAN:   Gabriel Payan is a 36 y.o. female, who was transferred to [facility] from [facility] ED after presenting with headache, CTA findings showing subocclusive dural venous sinus thrombosis extending form L transverse sinus into the sigmoid sinus. Dural Venous Sinus Thrombosis  Headaches, visual difficulty intermittently over past month and now worsening over past 2-3 weeks. CTA showing subocclusive dural venous sinus thrombosis extending from the left transverse sinus into the sigmoid sinus. Takes Estrogen at home to regulate irregular menses. - Hep gtt  - SBP <160; use Labetalol PRN to achieve goal   - f/u b/l LE Doppler to look for LE DVT   - Neuro and NSGY c/s - appreciate reccs; NPO pending evaluation     Chronic Conditions:  Irregular Menses - Estrogen; held while inpatient in light of venous sinus thrombosis. HTN - Hx of 1 year ago. Managed with lifestyle modifications (weight loss). Not on any medications outpatient. Of note last outpatient visit was with FM in 6/302022 /85 and other vital signs stable.        Code Status: Full Code  FEN: CLD  PPX: Hep gtt    DISPO: ICU    This patient has been staffed and discussed with Dr. Guru Gonzalez. Gisela Sam MD, PGY-2  9/19/2022  6:02 PM  Patient examined, findings as discussed with Dr. Radha Reyna. Agree with assessment and plan. Acute venous sinus thrombosis associated with headache and hypertension. Background history of hypertension is quite variable, and current hypertension may be more reactive.   Controlling blood pressure with as needed medication, administering anticoagulation, and monitor closely in ICU

## 2022-09-19 NOTE — PROGRESS NOTES
Pt admitted to ICU. Pt alert and oriented to place, time, situation, and location. Pt in sinus rhythm. Pt endorses no pain. Pt NIHSS 0. Pt lungs sound clear.  Pt oriented to room and given call light, bed alarm set and Heparin drip rate dose verified

## 2022-09-19 NOTE — H&P
ICU HISTORY AND PHYSICAL       Code:  Full Code  PCP:  No primary care provider on file. Admit Date:  9/19/2022                              Hospital Day: 1  ICU Day: 1    CC: Headache    HISTORY OF PRESENT ILLNESS   Chan Newton is a 36 y.o. female with PMHx of headaches associated with episodic high blood pressure, presents for headache and recent cold. Patient states she had a headache starting on August 13th or 14th for 1 week of duration, and at that time her blood pressure elevated. She also states she had eye strain, with no real specific vision deficits but described it as needing to strain or squint to see clearly. She states she had an episode like this about a year ago, too. For this episode, she took magnesium and relaxed and since then her blood pressure she reports was normal. For the past 1-2 weeks, she complains of sinus pressure, sore throat, non-productive cough, and having a cold. She says she had a gland swollen on the right side of her neck. For this cold, she had extra prednisone laying around, and reportedly took about 5 pills of unknown dose. She states she took one pill on Wednesday (9/14/22), Thursday, none Friday through Sunday as she traveled to [place], and then one pill this morning (9/19/22). She states she took a COVID test everyday and all were negative. She is Vaccinated and boosted but the last booster was [employer] while ago. \" She complains of a headache that is pressure like, on the right side on the top of her head and the back, and is a 5/10 in intensity. She continues to have to strain for visual acuity. The only medicine she regularly takes is her Estrogen birth control. She denies other supllemnts or medications. She otherwise denies other visual deficits, CP, SOB, bowel or urinary changes, balance changes, or any other complaints. At [facility] ED, BP was 170/90's.  CTA head \"Findings suspicious for subocclusive dural venous sinus thrombosis extending from the left transverse sinus into the sigmoid sinus and potentially upper internal jugular vein. \" CT HEAD negative for acute hemorrhage, CXR negative. Transferred to [facility] ICU for Nsurg eval.     PAST HISTORY     Past Medical History:   Diagnosis Date    Bacterial vaginosis 2019       Past Surgical History:   Procedure Laterality Date     SECTION      TUBAL LIGATION         Social History:   The patient lives at home  Alcohol: 1-2 shots a weak  Illicit drugs: Denies  Tobacco:  Hookah \"once in a blue moon\", otherwise none    Family History:  History reviewed. No pertinent family history. MEDICATIONS     No current facility-administered medications on file prior to encounter. Current Outpatient Medications on File Prior to Encounter   Medication Sig Dispense Refill    NONFORMULARY Oral BCP           Scheduled Meds:     sodium chloride flush  5-40 mL IntraVENous 2 times per day      Continuous Infusions:     heparin (PORCINE) Infusion 12 Units/kg/hr (22 1705)    sodium chloride         Allergies: Allergies   Allergen Reactions    Codeine Hives         REVIEW OF SYSTEMS   History obtained from chart review and the patient    Review of Systems   Constitutional: Negative. Eyes:  Positive for visual disturbance. Respiratory: Negative. Cardiovascular: Negative. Gastrointestinal: Negative. Genitourinary: Negative. Musculoskeletal: Negative. Skin: Negative. Neurological:  Positive for headaches. PHYSICAL EXAM     Vitals:    22 1700 22 1715 22 1720 22 1730   BP: (!) 165/98 (!) 179/82 (!) 166/76 (!) 141/87   Pulse: 61 60 61 69   Resp: 11 10 12 13   Temp:       TempSrc:       SpO2: 100% 98% 98% 96%   Weight:           I/O:    No intake or output data in the 24 hours ending 22 1807  No intake/output data recorded. No intake/output data recorded. Physical Exam  Constitutional:       Appearance: Normal appearance.    HENT:      Head: Normocephalic and atraumatic. Mouth/Throat:      Mouth: Mucous membranes are dry. Eyes:      Extraocular Movements: Extraocular movements intact. Cardiovascular:      Rate and Rhythm: Normal rate and regular rhythm. Pulses: Normal pulses. Heart sounds: Normal heart sounds. Pulmonary:      Effort: Pulmonary effort is normal.      Breath sounds: Normal breath sounds. Abdominal:      General: Abdomen is flat. Palpations: Abdomen is soft. Musculoskeletal:      Cervical back: Normal range of motion. Skin:     General: Skin is warm and dry. Neurological:      Mental Status: She is alert and oriented to person, place, and time. ACCESS   Central:  Day                                  Peripheral:  Day   Arterial:  Day                             Martinez:  Day  NGT/OGT:  Day    VENTILATOR   ETT:  Day  Vent Settings: FLOW([id number])@  / / /   ABGs:  No results for input(s): PHART, GDU3EJI, PO2ART in the last 72 hours. DATA   Labs:  Recent Labs     09/19/22  1226   WBC 9.9   HGB 12.1   HCT 36.7          Recent Labs     09/19/22  1226      K 4.3      CO2 24   BUN 7   CREATININE <0.5*   GLUCOSE 110*     Recent Labs     09/19/22  1226   AST 14*   ALT 12   BILITOT 0.3   ALKPHOS 65     Recent Labs     09/19/22  1226   TROPONINI <0.01     No results for input(s): BNP in the last 72 hours. No results for input(s): PHART, FRU4FST, PO2ART in the last 72 hours. No results for input(s): INR in the last 72 hours. Urinalysis: No results for input(s): NITRITE, COLORU, PHUR, LABCAST, WBCUA, RBCUA, MUCUS, TRICHOMONAS, YEAST, BACTERIA, CLARITYU, SPECGRAV, LEUKOCYTESUR, UROBILINOGEN, BILIRUBINUR, BLOODU, GLUCOSEU, AMORPHOUS in the last 72 hours. Invalid input(s): Hunter Kat    Micro:     RADIOLOGY:  CT HEAD WO CONTRAST   Final Result      No acute intracranial hemorrhage or mass effect.                XR CHEST PORTABLE   Final Result      No acute radiographic abnormality of the chest.      CTA HEAD NECK W CONTRAST   Final Result      CTA Head:   Findings suspicious for subocclusive dural venous sinus thrombosis extending from the left transverse sinus into the sigmoid sinus and potentially upper internal jugular vein. No arterial steno-occlusive disease or aneurysm. CTA Neck:   No arterial steno-occlusive disease or dissection. VL Extremity Venous Bilateral    (Results Pending)       EKG: Sinus Carlitos    Echo:       ASSESSMENT AND PLAN   36 y.o. female, who presented for headache and recent respiratory cold. She was found to have dural venous sinus thrombosis. Dural Venous Sinus Thrombosis  CTA head \"Findings suspicious for subocclusive dural venous sinus thrombosis extending from the left transverse sinus into the sigmoid sinus and potentially upper internal jugular vein. \"  On estrogen at home  [address] \"once in a blue moon\", but no regular tobacco use  Recent respiratory illness, COVID negative  No known family Hx of thrombosis   -Therapeutic Heparin gtt  -Anti-Xa  -BP control with SBP goal <160  -Doppler BL LE to r/o  -Q1 neuro checks. Repeat CTA   -Neuro recs  -Neurosurgery recs    Hypertension, unspecified  -Episodic with headaches, states was high a year ago and controlled with weight loss. Outpatient visits appear to be in the 003'K systolic    Irregular Menses   - Estrogen at home    Code Status:  Full Code  FEN: ADULT DIET;  Clear Liquid  PPX:  Heparin gtt  DISPO: ICU      This patient has been staffed and discussed with Albert Stubbs MD.     Josie Fair MD  Internal Medicine, PGY-1  9/19/2022, 6:07 PM

## 2022-09-19 NOTE — ED NOTES
Report to [facility]. Pt alert and oriented at time of discharge and agreeable to admission.      Deann Montelongo RN  09/19/22 2857

## 2022-09-19 NOTE — H&P
4 Eyes Admission Assessment     I agree as the admission nurse that 2 RN's have performed a thorough Head to Toe Skin Assessment on the patient. ALL assessment sites listed below have been assessed on admission. Areas assessed by both nurses:   [x]   Head, Face, and Ears   [x]   Shoulders, Back, and Chest  [x]   Arms, Elbows, and Hands   [x]   Coccyx, Sacrum, and Ischium  [x]   Legs, Feet, and Heels        Does the Patient have Skin Breakdown?   No         Franco Prevention initiated:  No   Wound Care Orders initiated:  No      [facility] nurse consulted for Pressure Injury (Stage 3,4, Unstageable, DTI, NWPT, and Complex wounds) or Franco score 18 or lower:  No      Nurse 1 eSignature: Electronically signed by Deandre Dumont RN on 9/19/22 at 5:48 PM EDT    **SHARE this note so that the co-signing nurse is able to place an eSignature**    Nurse 2 eSignature: Electronically signed by Jun Hernandez RN on 9/19/22 at 6:27 PM EDT

## 2022-09-20 ENCOUNTER — APPOINTMENT (OUTPATIENT)
Dept: VASCULAR LAB | Age: 42
DRG: 093 | End: 2022-09-20
Payer: COMMERCIAL

## 2022-09-20 ENCOUNTER — APPOINTMENT (OUTPATIENT)
Dept: MRI IMAGING | Age: 42
DRG: 093 | End: 2022-09-20
Payer: COMMERCIAL

## 2022-09-20 PROBLEM — I10 HYPERTENSION: Status: ACTIVE | Noted: 2022-09-20

## 2022-09-20 PROBLEM — G08 DURAL VENOUS SINUS THROMBOSIS: Status: ACTIVE | Noted: 2022-09-20

## 2022-09-20 LAB
ANION GAP SERPL CALCULATED.3IONS-SCNC: 12 MMOL/L (ref 3–16)
ANTI-XA UNFRAC HEPARIN: 0.25 IU/ML (ref 0.3–0.7)
ANTI-XA UNFRAC HEPARIN: 0.31 IU/ML (ref 0.3–0.7)
ANTI-XA UNFRAC HEPARIN: 0.35 IU/ML (ref 0.3–0.7)
ANTI-XA UNFRAC HEPARIN: 0.48 IU/ML (ref 0.3–0.7)
BASOPHILS ABSOLUTE: 0 K/UL (ref 0–0.2)
BASOPHILS RELATIVE PERCENT: 0.3 %
BUN BLDV-MCNC: 9 MG/DL (ref 7–20)
CALCIUM SERPL-MCNC: 9.3 MG/DL (ref 8.3–10.6)
CHLORIDE BLD-SCNC: 100 MMOL/L (ref 99–110)
CO2: 25 MMOL/L (ref 21–32)
CREAT SERPL-MCNC: 0.7 MG/DL (ref 0.6–1.1)
EOSINOPHILS ABSOLUTE: 0.2 K/UL (ref 0–0.6)
EOSINOPHILS RELATIVE PERCENT: 2.1 %
GFR AFRICAN AMERICAN: >60
GFR NON-AFRICAN AMERICAN: >60
GLUCOSE BLD-MCNC: 105 MG/DL (ref 70–99)
HCT VFR BLD CALC: 35 % (ref 36–48)
HEMOGLOBIN: 11.6 G/DL (ref 12–16)
LYMPHOCYTES ABSOLUTE: 4.7 K/UL (ref 1–5.1)
LYMPHOCYTES RELATIVE PERCENT: 42 %
MCH RBC QN AUTO: 28.5 PG (ref 26–34)
MCHC RBC AUTO-ENTMCNC: 33.2 G/DL (ref 31–36)
MCV RBC AUTO: 86 FL (ref 80–100)
MONOCYTES ABSOLUTE: 0.5 K/UL (ref 0–1.3)
MONOCYTES RELATIVE PERCENT: 4.9 %
NEUTROPHILS ABSOLUTE: 5.7 K/UL (ref 1.7–7.7)
NEUTROPHILS RELATIVE PERCENT: 50.7 %
PDW BLD-RTO: 13.8 % (ref 12.4–15.4)
PLATELET # BLD: 233 K/UL (ref 135–450)
PMV BLD AUTO: 9.7 FL (ref 5–10.5)
POTASSIUM REFLEX MAGNESIUM: 3.8 MMOL/L (ref 3.5–5.1)
RBC # BLD: 4.07 M/UL (ref 4–5.2)
SODIUM BLD-SCNC: 137 MMOL/L (ref 136–145)
WBC # BLD: 11.3 K/UL (ref 4–11)

## 2022-09-20 PROCEDURE — 85520 HEPARIN ASSAY: CPT

## 2022-09-20 PROCEDURE — 93970 EXTREMITY STUDY: CPT

## 2022-09-20 PROCEDURE — 36415 COLL VENOUS BLD VENIPUNCTURE: CPT

## 2022-09-20 PROCEDURE — 70551 MRI BRAIN STEM W/O DYE: CPT

## 2022-09-20 PROCEDURE — 6360000002 HC RX W HCPCS

## 2022-09-20 PROCEDURE — 6370000000 HC RX 637 (ALT 250 FOR IP): Performed by: STUDENT IN AN ORGANIZED HEALTH CARE EDUCATION/TRAINING PROGRAM

## 2022-09-20 PROCEDURE — 85025 COMPLETE CBC W/AUTO DIFF WBC: CPT

## 2022-09-20 PROCEDURE — 99291 CRITICAL CARE FIRST HOUR: CPT | Performed by: PSYCHIATRY & NEUROLOGY

## 2022-09-20 PROCEDURE — 6360000002 HC RX W HCPCS: Performed by: STUDENT IN AN ORGANIZED HEALTH CARE EDUCATION/TRAINING PROGRAM

## 2022-09-20 PROCEDURE — APPNB180 APP NON BILLABLE TIME > 60 MINS

## 2022-09-20 PROCEDURE — 80048 BASIC METABOLIC PNL TOTAL CA: CPT

## 2022-09-20 PROCEDURE — 2580000003 HC RX 258: Performed by: STUDENT IN AN ORGANIZED HEALTH CARE EDUCATION/TRAINING PROGRAM

## 2022-09-20 PROCEDURE — 1200000000 HC SEMI PRIVATE

## 2022-09-20 RX ORDER — MIDAZOLAM HYDROCHLORIDE 1 MG/ML
0.25 INJECTION INTRAMUSCULAR; INTRAVENOUS
Status: COMPLETED | OUTPATIENT
Start: 2022-09-20 | End: 2022-09-20

## 2022-09-20 RX ORDER — MIDAZOLAM HYDROCHLORIDE 1 MG/ML
0.25 INJECTION INTRAMUSCULAR; INTRAVENOUS ONCE
Status: DISCONTINUED | OUTPATIENT
Start: 2022-09-20 | End: 2022-09-20

## 2022-09-20 RX ADMIN — HEPARIN SODIUM AND DEXTROSE 16 UNITS/KG/HR: 10000; 5 INJECTION INTRAVENOUS at 22:51

## 2022-09-20 RX ADMIN — MIDAZOLAM HYDROCHLORIDE 0.25 MG: 1 INJECTION, SOLUTION INTRAMUSCULAR; INTRAVENOUS at 21:40

## 2022-09-20 RX ADMIN — HEPARIN SODIUM AND DEXTROSE 16 UNITS/KG/HR: 10000; 5 INJECTION INTRAVENOUS at 15:27

## 2022-09-20 RX ADMIN — SODIUM CHLORIDE, PRESERVATIVE FREE 10 ML: 5 INJECTION INTRAVENOUS at 20:40

## 2022-09-20 RX ADMIN — ACETAMINOPHEN 650 MG: 325 TABLET, FILM COATED ORAL at 01:12

## 2022-09-20 RX ADMIN — ACETAMINOPHEN 650 MG: 325 TABLET, FILM COATED ORAL at 22:57

## 2022-09-20 ASSESSMENT — PAIN DESCRIPTION - LOCATION
LOCATION: HEAD
LOCATION: HAND
LOCATION: HEAD
LOCATION: ARM

## 2022-09-20 ASSESSMENT — ENCOUNTER SYMPTOMS
RESPIRATORY NEGATIVE: 1
GASTROINTESTINAL NEGATIVE: 1

## 2022-09-20 ASSESSMENT — PAIN DESCRIPTION - DESCRIPTORS
DESCRIPTORS: DULL;PRESSURE
DESCRIPTORS: PRESSURE
DESCRIPTORS: DISCOMFORT;NAGGING

## 2022-09-20 ASSESSMENT — PAIN - FUNCTIONAL ASSESSMENT: PAIN_FUNCTIONAL_ASSESSMENT: ACTIVITIES ARE NOT PREVENTED

## 2022-09-20 ASSESSMENT — PAIN SCALES - GENERAL
PAINLEVEL_OUTOF10: 3
PAINLEVEL_OUTOF10: 3
PAINLEVEL_OUTOF10: 5
PAINLEVEL_OUTOF10: 10

## 2022-09-20 ASSESSMENT — PAIN DESCRIPTION - ORIENTATION
ORIENTATION: LEFT
ORIENTATION: MID
ORIENTATION: RIGHT

## 2022-09-20 NOTE — CARE COORDINATION
Case management is following for discharge planning. The chart was reviewed. The pt is from home. She is independent with all self care and functional mobility, an active , and employed. No CM needs anticipated.     Electronically signed by JUANJO Mcdonnell RN-[facility]  Case Management  [phone]

## 2022-09-20 NOTE — PROGRESS NOTES
Not applicable / Not valid  -- Disagree - Clinically unable to determine / Unknown  -- Refer to Clinical Documentation Reviewer    PROVIDER RESPONSE TEXT:    This patient has hypertensive urgency.     Query created by: Connor Luna on 9/20/2022 11:30 AM      Electronically signed by:  Randolph Curry MD 9/20/2022 2:46 PM

## 2022-09-20 NOTE — PLAN OF CARE
Problem: Discharge Planning  Goal: Discharge to home or other facility with appropriate resources  Outcome: Progressing     Problem: ABCDS Injury Assessment  Goal: Absence of physical injury  Outcome: Progressing     Problem: Safety - Adult  Goal: Free from fall injury  Outcome: Progressing     Problem: Skin/Tissue Integrity  Goal: Absence of new skin breakdown  Description: 1. Monitor for areas of redness and/or skin breakdown  2. Assess vascular access sites hourly  3. Every 4-6 hours minimum:  Change oxygen saturation probe site  4. Every 4-6 hours:  If on nasal continuous positive airway pressure, respiratory therapy assess nares and determine need for appliance change or resting period.   Outcome: Progressing

## 2022-09-20 NOTE — PROGRESS NOTES
ICU Progress Note    Admit Date: 9/19/2022  Day: 2  Vent Day:    IV Access:    IV Fluids:    Vasopressors:    Antibiotics:    Diet:  ADULT DIET; Regular; Low Sodium (2 gm)    CC: Headache    Interval History: No acute overnight events. Headaches relieved by tylenol. HPI: Niraj Haynes is a 36 y.o. female with PMHx of headaches associated with episodic high blood pressure, presents for headache and recent cold. Patient states she had a headache starting on August 13th or 14th for 1 week of duration, and at that time her blood pressure elevated. She also states she had eye strain, with no real specific vision deficits but described it as needing to strain or squint to see clearly. She states she had an episode like this about a year ago, too. For this episode, she took magnesium and relaxed and since then her blood pressure she reports was normal. For the past 1-2 weeks, she complains of sinus pressure, sore throat, non-productive cough, and having a cold. She says she had a gland swollen on the right side of her neck. For this cold, she had extra prednisone laying around, and reportedly took about 5 pills of unknown dose. She states she took one pill on Wednesday (9/14/22), Thursday, none Friday through Sunday as she traveled to [place], and then one pill this morning (9/19/22). She states she took a COVID test everyday and all were negative. She is Vaccinated and boosted but the last booster was [place] while ago. \" She complains of a headache that is pressure like, on the right side on the top of her head and the back, and is a 5/10 in intensity. She continues to have to strain for visual acuity. The only medicine she regularly takes is her Estrogen birth control. She denies other supllemnts or medications. She otherwise denies other visual deficits, CP, SOB, bowel or urinary changes, balance changes, or any other complaints. [facility], BP was 170/90's.  CTA head \"Findings suspicious for subocclusive dural venous sinus thrombosis extending from the left transverse sinus into the sigmoid sinus and potentially upper internal jugular vein. \" CT HEAD negative for acute hemorrhage, CXR negative. Transferred to [facility] ICU for Nsurg eval.       Review of Systems   Constitutional: Negative. Eyes:  Positive for visual disturbance. Respiratory: Negative. Cardiovascular: Negative. Gastrointestinal: Negative. Genitourinary: Negative. Musculoskeletal: Negative. Skin: Negative. Neurological:  Positive for headaches.      Objective     Scheduled Meds:     sodium chloride flush  5-40 mL IntraVENous 2 times per day     Continuous Infusions:     heparin (PORCINE) Infusion 16 Units/kg/hr (09/20/22 1000)    sodium chloride       PRN Meds:  sodium chloride flush, sodium chloride, ondansetron **OR** ondansetron, polyethylene glycol, acetaminophen **OR** acetaminophen, labetalol, hydrALAZINE    Vitals:   T-max:  Patient Vitals for the past 8 hrs:   BP Temp Temp src Pulse Resp SpO2   09/20/22 1200 138/84 -- -- 64 12 98 %   09/20/22 1159 133/79 98.3 °F (36.8 °C) Oral 68 14 97 %   09/20/22 1100 114/82 -- -- 63 17 97 %   09/20/22 1000 125/79 -- -- 67 16 97 %   09/20/22 0900 124/82 -- -- 80 15 98 %   09/20/22 0800 109/70 98.6 °F (37 °C) Oral 77 15 97 %   09/20/22 0730 123/73 -- -- 70 15 99 %   09/20/22 0700 118/64 -- -- 62 16 97 %   09/20/22 0630 (!) 147/79 -- -- 67 13 99 %   09/20/22 0600 (!) 146/78 -- -- 68 14 98 %   09/20/22 0500 (!) 137/91 -- -- 67 14 97 %         Intake/Output Summary (Last 24 hours) at 9/20/2022 1248  Last data filed at 9/20/2022 0818  Gross per 24 hour   Intake 281 ml   Output 600 ml   Net -319 ml       Access:   Central:  Day                                  Peripheral:  Day   Arterial:  Day                             Martinez Day:  NGT Day:                                           ETT Day:  Vent Settings:  RA, assist control, or bilevel/PEEP /FiO2 /RR / TV      / / /     ABGs:  No results for input(s): PHART, TSG1DUW, PO2ART, OFQ4MDN, BEART, THGBART, E7XXNNXU, KNP1GNZ in the last 72 hours. Physical Exam:  Physical Exam  Constitutional:       Appearance: Normal appearance. HENT:      Mouth/Throat:      Mouth: Mucous membranes are dry. Eyes:      Extraocular Movements: Extraocular movements intact. Cardiovascular:      Rate and Rhythm: Normal rate and regular rhythm. Pulses: Normal pulses. Heart sounds: Normal heart sounds. Pulmonary:      Effort: Pulmonary effort is normal.      Breath sounds: Normal breath sounds. Abdominal:      General: Abdomen is flat. Palpations: Abdomen is soft. Musculoskeletal:         General: Normal range of motion. Cervical back: Normal range of motion. Skin:     General: Skin is warm. Neurological:      Mental Status: She is alert and oriented to person, place, and time. Labs:  CBC:   Recent Labs     09/19/22  1226 09/20/22  0439   WBC 9.9 11.3*   HGB 12.1 11.6*   HCT 36.7 35.0*    233   MCV 85.4 86.0     Renal:    Recent Labs     09/19/22  1226 09/20/22  0439    137   K 4.3 3.8    100   CO2 24 25   BUN 7 9   CREATININE <0.5* 0.7   GLUCOSE 110* 105*   CALCIUM 9.4 9.3   ANIONGAP 9 12     Hepatic:   Recent Labs     09/19/22  1226   AST 14*   ALT 12   BILITOT 0.3   PROT 7.8   LABALBU 3.9   ALKPHOS 65     Troponin:   Recent Labs     09/19/22  1226   TROPONINI <0.01     BNP: No results for input(s): BNP in the last 72 hours. Lipids: No results for input(s): CHOL, HDL in the last 72 hours. Invalid input(s): LDLCALCU, TRIGLYCERIDE  INR: No results for input(s): INR in the last 72 hours. Lactate: No results for input(s): LACTATE in the last 72 hours. Cultures:      Radiology:   VL Extremity Venous Bilateral         CT HEAD WO CONTRAST   Final Result      No acute intracranial hemorrhage or mass effect.                XR CHEST PORTABLE   Final Result      No acute radiographic abnormality of the chest.      CTA HEAD NECK W CONTRAST   Final Result      CTA Head:   Findings suspicious for subocclusive dural venous sinus thrombosis extending from the left transverse sinus into the sigmoid sinus and potentially upper internal jugular vein. No arterial steno-occlusive disease or aneurysm. CTA Neck:   No arterial steno-occlusive disease or dissection. MRV HEAD W WO CONTRAST    (Results Pending)   MRI BRAIN WO CONTRAST    (Results Pending)         Assessment/Plan   36 y.o. female, who presented for headache and recent respiratory cold. She was found to have dural venous sinus thrombosis. Dural Venous Sinus Thrombosis  CTA head \"Findings suspicious for subocclusive dural venous sinus thrombosis extending from the left transverse sinus into the sigmoid sinus and potentially upper internal jugular vein. \"  On estrogen at home for about a year  Smokes Hookah \"once in a blue moon\", but no regular tobacco use  Recent respiratory illness, COVID negative  No known family Hx of thrombosis  Risk for thrombosis associated with estrogen use. -Therapeutic Heparin gtt (no bolus)  -Anti-Xa monitoring Heparin  -BP control with SBP goal <160  -Doppler BL LE to r/o: Negative  -Q2 neuro checks while Heparin subtherapeutic. Obtain CTV if change in Neuro exam  -Neuro recs  -Neurosurgery recs     Hypertension, unspecified  -Episodic with headaches, states was high a year ago and controlled with weight loss. Outpatient visits appear to be in the 124'T systolic  Blood pressure in hospital has been <709 systolic, with no medication since last p.m. Irregular Menses   - Estrogen at home, about 1 year    Code Status: FULL  FEN: ADULT DIET; Clear Liquid  PPX: Therapeutic Heparin   DISPO: ICU    Danitza Torres MD  PGY-1  09/20/22  12:48 PM    This patient has been staffed and discussed with Dr. Mindy Marcus. Patient examined, findings as discussed with Dr. Cricket Montoya.  Agree with assessment and plan as edited above.   Continuing heparin infusion for dural venous sinus thrombosis. Coagulopathy risk likely secondary to estrogen use. No signs of venous thrombosis elsewhere. Hypertension has not required treatment, with control of pain and anxiety.

## 2022-09-20 NOTE — H&P
Neurology / [facility] Consult Note    Aislinn Lopez MD is requesting this consult. Reason for Consult: Dural Venus Thrombosis   Admission Chief Complaint: Blood clot in my brain     History of Present Illness     Samia Burns is a 36 y.o. y/o female with no significant medical history who presented to [address] [place] [address] ED complaints of headache and visual difficulties. She endorses she started having a headache about a month ago that lasted about a week. She described it as aching/pressure-like, all over her head and rated at 5/10. She checked her blood pressure during this time and it was high, reported was 170's/110. She does not have a history of hypertension. Her headache was unrelieved by Tylenol or at home remedies and resolved spontaneously when her blood pressure returned back to normal.     However, over the last two weeks or so, her headache returned in similar fashion except this time she was feeling under the weather and was accompanied by visual changes, sinus pressure, sore throat and a swollen lymph node in her right neck. She felt that her vision was blurry and that she had to squint and strain more to focus. Nothing made her vision better or worse. She does not wear glasses or contacts. She took some spare Prednisone she had laying around for the few days prior leading up to admission but this did not help. Given the persistence of her symptoms, her friends and family urged her to seek medical evaluation. She denies any other focal neurological deficits such as lateralizing weakness, double vision, numbness, tingling, speech or gait disturbance. Her only home medication is oral contraceptives with estrogen which she has been on for about the last year. She had COVID twice, most recently in December of 2021, has been Vaccinated and received a Booster dose in October of 2021.  She denies recent head injury or  personal/family history of clotting disorders, miscarriages, malignancy or connective tissue disorder. On presentation to [facility] ED, VS significant for 'Q systolic other VSS. CTA head in outside ED showed findings suspicious for subocclusive dural venous sinus thrombosis extending from the left transverse sinus into the sigmoid sinus and potentially upper internal jugular vein. CT of the head was without hemorraghic. She was transferred to [facility] for ICU care and NSGY evaluation recommended. Upon arrival to [facility], her BP was in the 170's/90's. She was still complaining of a mild, pressure-like headache rated at 5/10 but overall felt well. She was still experiencing some visual strain that was unchanged but no photophobia. She was started on a heparin gtt. REVIEW OF SYSTEMS:   Constitutional- +weight loss on purpose over the last year. +malaise. No loss or fevers. Eyes- No diplopia. No photophobia. +blurry vision   Ears/nose/throat- No dysphagia. No Dysarthria   Cardiovascular- No palpitations. No chest pain   Respiratory- No dyspnea. No Cough   Gastrointestinal- No Abdominal pain. No Vomiting. Genitourinary- No incontinence. No urinary retention   Musculoskeletal- No myalgia. No arthralgia   Skin- No rash. No easy bruising. Psychiatric- No depression. No anxiety   Endocrine- No diabetes. No thyroid issues. Hematologic- No bleeding difficulty. No fatigue   Neurologic- +headache. No lateralizing weakness, numbness, tingling or speech changes,     Past Medical, Surgical, Family, and Social History   PAST MEDICAL HISTORY:  Past Medical History:   Diagnosis Date    Bacterial vaginosis 2019     SURGICAL HISTORY:  Past Surgical History:   Procedure Laterality Date     SECTION      TUBAL LIGATION       FAMILY HISTORY & SOCIAL HISTORY:  Family history non-contributory  History reviewed. No pertinent family history.   Social History     Tobacco Use    Smoking status: Never    Smokeless tobacco: Never   Substance Use Topics    Alcohol use: Yes     Comment: occ    Drug use: No         Allergies & Outpatient Medications   ALLERGIES:  Allergies   Allergen Reactions    Codeine Hives     HOME MEDICATIONS:  Current Discharge Medication List        CONTINUE these medications which have NOT CHANGED    Details   NONFORMULARY Oral BCP               Physical Exam   PHYSICAL EXAM:  Vitals:    09/20/22 0600 09/20/22 0630 09/20/22 0700 09/20/22 0730   BP: (!) 146/78 (!) 147/79 118/64 123/73   Pulse: 68 67 62 70   Resp: 14 13 16 15   Temp:       TempSrc:       SpO2: 98% 99% 97% 99%   Weight:             General: Alert, no distress, well-nourished  Neurologic  Mental status:   orientation to person, place, time, situation   Attention intact as able to attend well to the exam     Language fluent in conversation   Comprehension intact; follows simple commands    Cranial nerves:   CN2: Visual fields full w/o extinction on confrontational testing   CN 3,4,6: Pupils equal and reactive to light, extraocular muscles intact  CN5: Facial sensation symmetric   CN7: Face symmetric  CN8: Hearing symmetric to spoken voice  CN9: Palate elevated symmetrically  CN11: Traps full strength on shoulder shrug  CN12: Tongue midline with protrusion    Motor Exam:   R  L    Deltoid 5  5   Biceps 5 5   Triceps 5 5   Wrist extension  5 5   Interossei 5 5      R  L    Hip flexion  5  5   Hip extension  5 5   Knee flexion  5 5   Knee extension  5 5   Ankle dorsiflexion  5 5   Ankle plantar flexion  5 5     Sensory: light touch intact and symmetric in all 4 extremities. No sensory extinction on bilateral simultaneous stimulation  Cerebellar/coordination: finger nose finger normal without ataxia  Tone: normal in all 4 extremities  Gait: did not assess    OTHER SYSTEMS:  Cardiovascular: Warm, appears well perfused   Respiratory: Easy, non-labored respiratory pattern   Abdominal: Abdomen is without distention   Extremities: Upper and lower extremities are atraumatic in appearance without deformity. No swelling or erythema. Diagnostic Testing Results   IMAGES:  Images personally reviewed and agree w/ radiology interpretation. Head CT w/o Contrast: 9/19/22; 13:22  Impression       No acute intracranial hemorrhage or mass effect. CTA of Head / Neck w/ Contrast: 9/19/22; 13:07  Impression       CTA Head:   Findings suspicious for subocclusive dural venous sinus thrombosis extending from the left transverse sinus into the sigmoid sinus and potentially upper internal jugular vein. No arterial steno-occlusive disease or aneurysm. CTA Neck:   No arterial steno-occlusive disease or dissection. EKG: Sinus bradycardia     MRI: pending   MRV: pending     LABS:  All results below personally reviewed. Pertinent positives & negatives are addressed in Impression & Recommendations below. LABS   Metabolic Panel Recent Labs     09/19/22  1226 09/20/22  0439    137   K 4.3 3.8    100   CO2 24 25   BUN 7 9   CREATININE <0.5* 0.7   GLUCOSE 110* 105*   CALCIUM 9.4 9.3   LABALBU 3.9  --    ALKPHOS 65  --    ALT 12  --    AST 14*  --       CBC / Coags Recent Labs     09/19/22  1226 09/20/22  0439   WBC 9.9 11.3*   RBC 4.29 4.07   HGB 12.1 11.6*   HCT 36.7 35.0*    233      Other Recent Labs     09/19/22  1510   COVID19 Not Detected     No results for input(s): PHENYTOIN, KEPPRA, LACOSA, LAMO, VALPROATE, LACTSEPSIS, LACTA in the last 72 hours. Latest Reference Range & Units 10/28/12 19:10 9/19/22 20:53 9/20/22 04:39   Anti-XA Unfrac Heparin 0.30 - 0.70 IU/mL  0.13 (L) 0.31   Prothrombin Time 11.6 - 14.4 seconds 11.7     INR 0.9 - 1.1  0.8 (L)       CURRENT SCHEDULED MEDICATIONS   Inpatient Medications     sodium chloride flush, 5-40 mL, IntraVENous, 2 times per day   Infusions    heparin (PORCINE) Infusion 14 Units/kg/hr (09/19/22 2135)    sodium chloride        Antibiotics   Recent Abx Admin        No antibiotic orders with administrations found.                        IMPRESSION & RECOMMENDATIONS IMPRESSION:  -35 year old female with no significant medical history on oral contraceptives for the last year who presented with persistent headache and visual changes for about the last month accompanied by hypertension and recent cold-like symptoms.     -In setting of advanced age on oral contraceptives, I believe this played a primary role in her development of venous sinus thrombosis. -Though this was likely provoked by oral contraceptives, she should still be worked up from a hematological perspective for hypercoagulable disorders. RECOMMENDATIONS:  -Q2 neuro checks  -MRI brain without contrast and MRV with and without contrast (ordered)  -Once therapeutic on heparin gtt for 72 hours, obtain CTV of head  -If CTV stable and there is no change in neuro exam, can transition to [address]  -Hematology consulted for hypercoagulable workup    Jonathanp MINI Kiran - [facility]   Neurology & Neurocritical Care   Neurology Line: [phone]  [employer]: [facility] Neurology & Neuro Critical Care NPs  9/20/2022 8:07 AM    Critical Care:  Due to the immediate potential for life-threatening deterioration due to neurological failure, I spent 74 minutes providing critical care. This time excludes time spent performing procedures but includes time spent on direct patient care, history retrieval, review of the chart, and discussions with patient, family, and consultant(s).

## 2022-09-20 NOTE — PROGRESS NOTES
Progress Note  Admit Date: 9/19/2022       Overnight Events: none noted. CC: F/U for dural venous sinus thrombosis    Interval History: 36 y.o. female with PMHx of headaches associated with episodic high blood pressure, presents for headache and recent cold. Patient states she had a headache starting on August 13th or 14th for 1 week of duration, and at that time her blood pressure elevated. She also states she had eye strain, with no real specific vision deficits but described it as needing to strain or squint to see clearly. States she is feeling ok this am, no HA, no n/v/d/c.           sodium chloride flush  5-40 mL IntraVENous 2 times per day      PRN Medications: sodium chloride flush, sodium chloride, ondansetron **OR** ondansetron, polyethylene glycol, acetaminophen **OR** acetaminophen, labetalol, hydrALAZINE  Diet: ADULT DIET; Regular; Low Sodium (2 gm)  Continuous Infusions:   heparin (PORCINE) Infusion 16 Units/kg/hr (09/20/22 1000)    sodium chloride       PHYSICAL EXAM:  /84   Pulse 64   Temp 98.3 °F (36.8 °C) (Oral)   Resp 12   Wt 165 lb (74.8 kg)   LMP 09/10/2022   SpO2 98%   BMI 27.46 kg/m²   No results for input(s): POCGLU in the last 72 hours. Intake/Output Summary (Last 24 hours) at 9/20/2022 1302  Last data filed at 9/20/2022 0818  Gross per 24 hour   Intake 281 ml   Output 600 ml   Net -319 ml       Constitutional:       Appearance: Normal appearance. HENT:      Head: Normocephalic and atraumatic. Mouth/Throat:      Mouth: Mucous membranes are dry. Eyes:      Extraocular Movements: Extraocular movements intact. Cardiovascular:      Rate and Rhythm: Normal rate and regular rhythm. Pulses: Normal pulses. Heart sounds: Normal heart sounds. Pulmonary:      Effort: Pulmonary effort is normal.      Breath sounds: Normal breath sounds. Abdominal:      General: Abdomen is flat. Palpations: Abdomen is soft.    Musculoskeletal:      Cervical back:

## 2022-09-20 NOTE — CONSULTS
NEUROSURGERY CONSULT NOTE    Federico Cobos  [id number]   1980    Requesting physician: Keyla Wright MD    Reason for consultation: Dural Venous Sinus Thrombosis    History of present illness: Patient is a 36 y.o. female that  has a past medical history of Bacterial vaginosis (2019). Patient presented on 2022 to [address] [place] [address] ED c/o persistent headache and visual changes for about the last month. She complains of a headache that is pressure like, on the right side on the top of her head and the back, and is a 5/10 in intensity. She continues to have to strain for visual acuity. The only medicine she regularly takes is her Estrogen birth control. CT Head negative, but CTA Head/Neck suspicious for subocclusive dural venous sinus thrombosis extending from the left transverse sinus into the sigmoid sinus and potentially upper internal jugular vein. ROS:   GENERAL:  Denies fever or recent illness. Denies weight changes   EYES:  Denies vision change or diplopia  EARS:  Denies hearing loss  CARDIAC:  Denies chest pain  RESPIRATORY:  Denies shortness of breath  SKIN:  Denies rash or lesions   HEM:  Denies excessive bruising  PSYCH:  Denies anxiety or depression  NEURO:  Endorses headache and blurry vision; Denies numbness or tingling or lateralizing weakness   :  Denies urinary difficulty  GI: Denies nausea, vomiting, diarrhea or constipation  MUSCULOSKELETAL:  No arthralgias    Allergies   Allergen Reactions    Codeine Hives       Past Medical History:   Diagnosis Date    Bacterial vaginosis 2019        Past Surgical History:   Procedure Laterality Date     SECTION      TUBAL LIGATION         Social History     Occupational History    Not on file   Tobacco Use    Smoking status: Never    Smokeless tobacco: Never   Substance and Sexual Activity    Alcohol use: Yes     Comment: occ    Drug use: No    Sexual activity: Not Currently     Partners: Male        History reviewed.  No pertinent family history. Outpatient Medications Marked as Taking for the 9/19/22 encounter [facility] Encounter)   Medication Sig Dispense Refill    NONFORMULARY Oral BCP          Current Facility-Administered Medications   Medication Dose Route Frequency Provider Last Rate Last Admin    heparin 25,000 units in dextrose 5% 250 mL (premix) infusion  5-30 Units/kg/hr IntraVENous Continuous Stella Booker MD 12 mL/hr at 09/20/22 1000 16 Units/kg/hr at 09/20/22 1000    sodium chloride flush 0.9 % injection 5-40 mL  5-40 mL IntraVENous 2 times per day Stella Booker MD   10 mL at 09/19/22 2015    sodium chloride flush 0.9 % injection 5-40 mL  5-40 mL IntraVENous PRN Stella Booker MD        0.9 % sodium chloride infusion   IntraVENous PRN Stella Booker MD        ondansetron (ZOFRAN-ODT) disintegrating tablet 4 mg  4 mg Oral Q8H PRN Stella Booker MD        Or    ondansetron [facility]) injection 4 mg  4 mg IntraVENous Q6H PRN Stella Booker MD        polyethylene glycol (GLYCOLAX) packet 17 g  17 g Oral Daily PRN Stella Booker MD        acetaminophen (TYLENOL) tablet 650 mg  650 mg Oral Q6H PRN Stella Booker MD   650 mg at 09/20/22 0112    Or    acetaminophen (TYLENOL) suppository 650 mg  650 mg Rectal Q6H PRN Stella Booker MD        labetalol (NORMODYNE;TRANDATE) injection 10 mg  10 mg IntraVENous Q4H PRN Stella Booker MD        hydrALAZINE (APRESOLINE) injection 10 mg  10 mg IntraVENous Q6H PRN Stella Booker MD            Objective:  /79   Pulse 88   Temp 98.3 °F (36.8 °C) (Oral)   Resp 11   Wt 165 lb (74.8 kg)   LMP 09/10/2022   SpO2 94%   BMI 27.46 kg/m²     Physical Exam:   Patient seen and examined  GCS:  4 - Opens eyes on own  5 - Alert and oriented  6 - Follows simple motor commands  General: Well developed. Alert and cooperative in no acute distress.      HENT: atraumatic, neck supple  Eyes: Optic discs: Not tested  Pulmonary: unlabored respiratory effort  Cardiovascular:  Warm well perfused. No peripheral edema  Gastrointestinal: abdomen soft, NT, ND    Neurological:  Mental Status: Awake, alert, oriented x 4, speech clear and appropriate  Attention: Intact  Language: No aphasia or dysarthria noted  Sensation: Intact to all extremities to light touch  Coordination: Intact    Cranial Nerves:  II: Visual acuity not tested, denies new visual changes / diplopia  III, IV, VI: PERRL, 3 mm bilaterally, EOMI, no nystagmus noted  V: Facial sensation intact bilaterally to touch  VII: Face symmetric  VIII: Hearing intact bilaterally to spoken voice  IX: Palate movement equal bilaterally  XI: Shoulder shrug equal bilaterally  XII: Tongue midline    Musculoskeletal:   Gait: Not tested   Assist devices: None   Tone: Normal  Motor strength:    Right  Left    Right  Left    Deltoid  5 5  Hip Flex  5 5   Biceps  5 5  Knee Extensors  5 5   Triceps  5 5  Knee Flexors  5 5   Wrist Ext  5 5  Ankle Dorsiflex. 5 5   Wrist Flex  5 5  Ankle Plantarflex. 5 5   Handgrip  5 5  Ext Vazquez Longus  5 5   Thumb Ext  5 5         Radiological Findings:  CT HEAD WO CONTRAST  Result Date: 9/19/2022  No acute intracranial hemorrhage or mass effect. CTA HEAD NECK W CONTRAST  Result Date: 9/19/2022  CTA Head-  Findings suspicious for subocclusive dural venous sinus thrombosis extending from the left transverse sinus into the sigmoid sinus and potentially upper internal jugular vein. No arterial steno-occlusive disease or aneurysm. CTA Neck-  No arterial steno-occlusive disease or dissection. Labs:  Recent Labs     09/20/22 0439   WBC 11.3*   HGB 11.6*   HCT 35.0*          Recent Labs     09/20/22 0439      K 3.8      CO2 25   BUN 9   CREATININE 0.7   GLUCOSE 105*   CALCIUM 9.3       No results for input(s): PROTIME, INR, APTT in the last 72 hours.     Patient Active Problem List    Diagnosis Date Noted    Dural venous sinus thrombosis 09/20/2022    Venous thrombosis 09/19/2022

## 2022-09-20 NOTE — CONSULTS
Chart reviewed, events noted, and I have personally performed a face-to-face diagnostic evaluation on this patient. I have personally reviewed CNP's note and confirmed the documented past medical history, family history, social history, allergies, home medications, review of systems, vital signs, laboratory values, and hospital medications via my own chart review, in person with the patient, and/or in person with her family members as appropriate. My findings are as follows:    Assessment  - 43yo woman on OCPs but is a non-smoker presented to ER with several weeks of headache and the development of blurred vision and, most recently, decreased sensation in right face, arm, and leg, found to have cerebral venous sinus thrombosis  - COVID infection was last in December (9 months ago) and highly unlikely this would lead to any coagulopathy now  - Suppose that OCPs could be responsible; will need hematology input  - Would not expect intracerebral lesion to lead to poor visual acuity  - Am concerned, however, about face, arm, and leg mild numbness as possibly representing infarct  - Will check MRI now, but this will not likely   - Will also get dedicated venous imaging with MRV since needs imaging anyway    Recommendations  - Continue neuro-protocol heparin gtt (low dose, no boluses)  - MRI brain [ordered]  - MRV head [ordered]  - q2 neuro checks until therapeutic on heparin gtt x24 hours  - OK to transfer to floor once therapeutic on heparin gtt x24 hours  - Heme/Onc consult; appreciate assistance  - Repeat CTV once therapeutic on heparin gtt x72 hours and likely transition to [id number] at that point  - CNP note, to which this attestation is attached, contains remainder of assessment details & recommendations. History of Present Illness:  43yo woman with no known medical illnesses but had COVID twice (most recent in December 2021) and is fully vaccinated; on OCPs presented to ER with headache.  She reports that she began having a headache about a month ago. At that time it was about a 5/10 and involved her whole head. She had no other associated symptoms with it. She checked her blood pressure and it was elevated into the 127U systolic. She tried to address it with OTC analgesics, but to no avail. After about a week, the headache subsided on its own. She did well for about another week after that but then the headache returned. Now, for the past 2 weeks, she has had the same headache through her whole head but also developed blurred vision. She feels that she has to squint to see things more clearly, especially when reading. This is true when reading up close or far away. Fine print is almost impossible for her to read. When this headache and vision issues were not improving, she presented to the ER. Head CT/CTA head and neck revealed a sub-occlusive dural venous sinus thrombosis and she was started on heparin gtt. Currently, she reports that she feels well and her HA is better. However, she has begun to appreciate that things in her right face, arm, and leg do not feel as noticeable as her left. Her vision still remains blurred, as it was before    she is unsure what would have precipitated these symptoms, other than the above. she feels that nothing makes them better and nothing makes them worse. she rates the symptoms as severe. she denies any other associated symptoms including no other HA, no speech/language difficulties, no swallowing difficulties, no other visual changes, no diplopia, no hearing loss, no tinnitus, no vertigo, no imbalance, no light-headedness, no focal weakness, no other sensory changes, no nausea or vomiting. she has never had this problem before. There is no history of this problem or anything similar in her family members.      Neurological Exam (performed on 9/20/2022 at 1230):  -Mental status: A&O x3; pleasant & appropriate  -Memory: Recent & remote memory intact  -Attention: Normal  -Fund of Knowledge: Good  -Speech & Language: no aphasia; no dysarthria  -Cranial nerves: pupils 4mm->3mm bilaterally; fields intact; unable to visualize fundi; EOMI, no nystagmus; sensation intact V1, V2, V3 on left, decreased on right; no facial asymmetry; hearing intact bilaterally; palate elevates symmetrically; SCMs & trapezii intact bilaterally; tongue midline  -Sensory: intact to lt touch on left, decreased on right  -Motor:   RUE: 5/5, no pronator drift  RLE: 5/5  LUE: 5/5, no pronator drift  LLE: 5/5  -Tone: Normal throughout  -Reflexes: 1+ & symmetric throughout  -Coordination: FNF intact  -Gait & Station: deferred for pt safety  -Other: no adventitious movements noted  Other Systems  -General Appearance: well-developed, well-nourished, no apparent distress  -Neck: supple  -Lungs: breathing unlabored, regular, no audible wheezes  -CV: pulses strong x4 extremities  -Abd: flat  -Extrem: no c/c/e      Imaging & Testing:  CT HEAD WO CONTRAST  Images independently reviewed. Agree with findings. --KACHORIS    No acute intracranial hemorrhage or mass effect. CTA HEAD NECK W CONTRAST  Images independently reviewed. Agree with findings. --KACHORIS    CTA Head:   Findings suspicious for subocclusive dural venous sinus thrombosis extending from the left transverse sinus into the sigmoid sinus and potentially upper internal jugular vein. No arterial steno-occlusive disease or aneurysm. CTA Neck:   No arterial steno-occlusive disease or dissection. Discussed with pt; nursing; and Cesar Ashraf CNP    Due to the immediate potential for life-threatening deterioration due to neurological failure, I spent 50 minutes providing critical care. This time excludes time spent performing procedures but includes time spent on direct patient care, history retrieval, review of the chart, and discussions with patient, family, and consultant(s).     Joseph Novak MD  Neurology [phone]  9/20/2022              Neurology / Neurocritical Care Consult Note    Chavez Salinas MD is requesting this consult. Reason for Consult: Dural Venus Thrombosis   Admission Chief Complaint: Blood clot in my brain     History of Present Illness     Debby Kauffman is a 36 y.o. y/o female with no significant medical history who presented to [address] [place] [address] ED complaints of headache and visual difficulties. She endorses she started having a headache about a month ago that lasted about a week. She described it as aching/pressure-like, all over her head and rated at 5/10. She checked her blood pressure during this time and it was high, reported was 170's/110. She does not have a history of hypertension. Her headache was unrelieved by Tylenol or at home remedies and resolved spontaneously when her blood pressure returned back to normal.     However, over the last two weeks or so, her headache returned in similar fashion except this time she was feeling under the weather and was accompanied by visual changes, sinus pressure, sore throat and a swollen lymph node in her right neck. She felt that her vision was blurry and that she had to squint and strain more to focus. Nothing made her vision better or worse. She does not wear glasses or contacts. She took some spare Prednisone she had laying around for the few days prior leading up to admission but this did not help. Given the persistence of her symptoms, her friends and family urged her to seek medical evaluation. She denies any other focal neurological deficits such as lateralizing weakness, double vision, numbness, tingling, speech or gait disturbance. Her only home medication is oral contraceptives with estrogen which she has been on for about the last year. She had COVID twice, most recently in December of 2021, has been Vaccinated and received a Booster dose in October of 2021.  She denies recent head injury or  personal/family history of clotting disorders, miscarriages, malignancy or connective tissue disorder. On presentation to [facility] ED, VS significant for 'G systolic other VSS. CTA head in outside ED showed findings suspicious for subocclusive dural venous sinus thrombosis extending from the left transverse sinus into the sigmoid sinus and potentially upper internal jugular vein. CT of the head was without hemorraghic. She was transferred to [facility] for ICU care and NSGY evaluation recommended. Upon arrival to [facility], her BP was in the 170's/90's. She was still complaining of a mild, pressure-like headache rated at 5/10 but overall felt well. She was still experiencing some visual strain that was unchanged but no photophobia. She was started on a heparin gtt. REVIEW OF SYSTEMS:   Constitutional- +weight loss on purpose over the last year. +malaise. No loss or fevers. Eyes- No diplopia. No photophobia. +blurry vision   Ears/nose/throat- No dysphagia. No Dysarthria   Cardiovascular- No palpitations. No chest pain   Respiratory- No dyspnea. No Cough   Gastrointestinal- No Abdominal pain. No Vomiting. Genitourinary- No incontinence. No urinary retention   Musculoskeletal- No myalgia. No arthralgia   Skin- No rash. No easy bruising. Psychiatric- No depression. No anxiety   Endocrine- No diabetes. No thyroid issues. Hematologic- No bleeding difficulty. No fatigue   Neurologic- +headache. No lateralizing weakness, numbness, tingling or speech changes,     Past Medical, Surgical, Family, and Social History   PAST MEDICAL HISTORY:  Past Medical History:   Diagnosis Date    Bacterial vaginosis 2019     SURGICAL HISTORY:  Past Surgical History:   Procedure Laterality Date     SECTION      TUBAL LIGATION       FAMILY HISTORY & SOCIAL HISTORY:  Family history non-contributory  History reviewed. No pertinent family history.   Social History     Tobacco Use    Smoking status: Never    Smokeless tobacco: Never   Substance Use Topics    Alcohol use: Yes     Comment: occ    Drug use: No         Allergies & Outpatient Medications   ALLERGIES:  Allergies   Allergen Reactions    Codeine Hives     HOME MEDICATIONS:  Current Discharge Medication List        CONTINUE these medications which have NOT CHANGED    Details   NONFORMULARY Oral BCP               Physical Exam   PHYSICAL EXAM:  Vitals:    09/20/22 0600 09/20/22 0630 09/20/22 0700 09/20/22 0730   BP: (!) 146/78 (!) 147/79 118/64 123/73   Pulse: 68 67 62 70   Resp: 14 13 16 15   Temp:       TempSrc:       SpO2: 98% 99% 97% 99%   Weight:             General: Alert, no distress, well-nourished  Neurologic  Mental status:   orientation to person, place, time, situation   Attention intact as able to attend well to the exam     Language fluent in conversation   Comprehension intact; follows simple commands    Cranial nerves:   CN2: Visual fields full w/o extinction on confrontational testing   CN 3,4,6: Pupils equal and reactive to light, extraocular muscles intact  CN5: Facial sensation symmetric   CN7: Face symmetric  CN8: Hearing symmetric to spoken voice  CN9: Palate elevated symmetrically  CN11: Traps full strength on shoulder shrug  CN12: Tongue midline with protrusion    Motor Exam:   R  L    Deltoid 5  5   Biceps 5 5   Triceps 5 5   Wrist extension  5 5   Interossei 5 5      R  L    Hip flexion  5  5   Hip extension  5 5   Knee flexion  5 5   Knee extension  5 5   Ankle dorsiflexion  5 5   Ankle plantar flexion  5 5     Sensory: light touch intact and symmetric in all 4 extremities.   No sensory extinction on bilateral simultaneous stimulation  Cerebellar/coordination: finger nose finger normal without ataxia  Tone: normal in all 4 extremities  Gait: did not assess    OTHER SYSTEMS:  Cardiovascular: Warm, appears well perfused   Respiratory: Easy, non-labored respiratory pattern   Abdominal: Abdomen is without distention   Extremities: Upper and lower extremities are atraumatic in appearance without deformity. No swelling or erythema. Diagnostic Testing Results   IMAGES:  Images personally reviewed and agree w/ radiology interpretation. Head CT w/o Contrast: 9/19/22; 13:22  Impression       No acute intracranial hemorrhage or mass effect. CTA of Head / Neck w/ Contrast: 9/19/22; 13:07  Impression       CTA Head:   Findings suspicious for subocclusive dural venous sinus thrombosis extending from the left transverse sinus into the sigmoid sinus and potentially upper internal jugular vein. No arterial steno-occlusive disease or aneurysm. CTA Neck:   No arterial steno-occlusive disease or dissection. EKG: Sinus bradycardia     MRI: pending   MRV: pending     LABS:  All results below personally reviewed. Pertinent positives & negatives are addressed in Impression & Recommendations below. LABS   Metabolic Panel Recent Labs     09/19/22  1226 09/20/22  0439    137   K 4.3 3.8    100   CO2 24 25   BUN 7 9   CREATININE <0.5* 0.7   GLUCOSE 110* 105*   CALCIUM 9.4 9.3   LABALBU 3.9  --    ALKPHOS 65  --    ALT 12  --    AST 14*  --       CBC / Coags Recent Labs     09/19/22  1226 09/20/22  0439   WBC 9.9 11.3*   RBC 4.29 4.07   HGB 12.1 11.6*   HCT 36.7 35.0*    233      Other Recent Labs     09/19/22  1510   COVID19 Not Detected     No results for input(s): PHENYTOIN, KEPPRA, LACOSA, LAMO, VALPROATE, LACTSEPSIS, LACTA in the last 72 hours. Latest Reference Range & Units 10/28/12 19:10 9/19/22 20:53 9/20/22 04:39   Anti-XA Unfrac Heparin 0.30 - 0.70 IU/mL  0.13 (L) 0.31   Prothrombin Time 11.6 - 14.4 seconds 11.7     INR 0.9 - 1.1  0.8 (L)       CURRENT SCHEDULED MEDICATIONS   Inpatient Medications     sodium chloride flush, 5-40 mL, IntraVENous, 2 times per day   Infusions    heparin (PORCINE) Infusion 14 Units/kg/hr (09/19/22 2135)    sodium chloride        Antibiotics   Recent Abx Admin        No antibiotic orders with administrations found. IMPRESSION & RECOMMENDATIONS     IMPRESSION:  -35 year old female with no significant medical history on oral contraceptives for the last year who presented with persistent headache and visual changes for about the last month accompanied by hypertension and recent cold-like symptoms.     -In setting of advanced age on oral contraceptives, I believe this played a primary role in her development of venous sinus thrombosis. -Though this was likely provoked by oral contraceptives, she should still be worked up from a hematological perspective for hypercoagulable disorders. RECOMMENDATIONS:  -Q2 neuro checks until therapeutic on heparin drip for 24 hours then can be Q4 and transfer to floor. -MRI brain without contrast and MRV with and without contrast (ordered)  -Once therapeutic on heparin gtt for 72 hours, obtain CTV of head  -If CTV stable and there is no change in neuro exam, can transition to [facility]  -Hematology consulted for hypercoagulable workup    MINI Aguilar - [facility]   Neurology & Neurocritical Care   Neurology Line: [phone]  [employer]: [facility] Neurology & Neuro Critical Care NPs  9/20/2022 8:07 AM    Critical Care:  Due to the immediate potential for life-threatening deterioration due to neurological failure, I spent 40 minutes providing critical care. This time excludes time spent performing procedures but includes time spent on direct patient care, history retrieval, review of the chart, and discussions with patient, family, and consultant(s).

## 2022-09-21 ENCOUNTER — APPOINTMENT (OUTPATIENT)
Dept: CT IMAGING | Age: 42
DRG: 093 | End: 2022-09-21
Payer: COMMERCIAL

## 2022-09-21 VITALS
OXYGEN SATURATION: 97 % | DIASTOLIC BLOOD PRESSURE: 90 MMHG | TEMPERATURE: 97.8 F | HEART RATE: 71 BPM | WEIGHT: 165 LBS | BODY MASS INDEX: 27.46 KG/M2 | RESPIRATION RATE: 15 BRPM | SYSTOLIC BLOOD PRESSURE: 134 MMHG

## 2022-09-21 LAB
ANION GAP SERPL CALCULATED.3IONS-SCNC: 13 MMOL/L (ref 3–16)
ANTI-XA UNFRAC HEPARIN: >1.1 IU/ML (ref 0.3–0.7)
ANTI-XA UNFRAC HEPARIN: >1.1 IU/ML (ref 0.3–0.7)
BASOPHILS ABSOLUTE: 0.1 K/UL (ref 0–0.2)
BASOPHILS RELATIVE PERCENT: 1 %
BUN BLDV-MCNC: 7 MG/DL (ref 7–20)
CALCIUM SERPL-MCNC: 9.2 MG/DL (ref 8.3–10.6)
CHLORIDE BLD-SCNC: 101 MMOL/L (ref 99–110)
CO2: 24 MMOL/L (ref 21–32)
CREAT SERPL-MCNC: 0.6 MG/DL (ref 0.6–1.1)
EOSINOPHILS ABSOLUTE: 0.2 K/UL (ref 0–0.6)
EOSINOPHILS RELATIVE PERCENT: 1.5 %
GFR AFRICAN AMERICAN: >60
GFR NON-AFRICAN AMERICAN: >60
GLUCOSE BLD-MCNC: 107 MG/DL (ref 70–99)
HCT VFR BLD CALC: 36.5 % (ref 36–48)
HEMOGLOBIN: 12.3 G/DL (ref 12–16)
LYMPHOCYTES ABSOLUTE: 4.2 K/UL (ref 1–5.1)
LYMPHOCYTES RELATIVE PERCENT: 34.8 %
MCH RBC QN AUTO: 28.9 PG (ref 26–34)
MCHC RBC AUTO-ENTMCNC: 33.6 G/DL (ref 31–36)
MCV RBC AUTO: 86 FL (ref 80–100)
MONOCYTES ABSOLUTE: 0.6 K/UL (ref 0–1.3)
MONOCYTES RELATIVE PERCENT: 4.6 %
NEUTROPHILS ABSOLUTE: 7.1 K/UL (ref 1.7–7.7)
NEUTROPHILS RELATIVE PERCENT: 58.1 %
PDW BLD-RTO: 14 % (ref 12.4–15.4)
PLATELET # BLD: 248 K/UL (ref 135–450)
PMV BLD AUTO: 10 FL (ref 5–10.5)
POTASSIUM REFLEX MAGNESIUM: 4.6 MMOL/L (ref 3.5–5.1)
RBC # BLD: 4.24 M/UL (ref 4–5.2)
REASON FOR REJECTION: NORMAL
REJECTED TEST: NORMAL
SODIUM BLD-SCNC: 138 MMOL/L (ref 136–145)
WBC # BLD: 12.1 K/UL (ref 4–11)

## 2022-09-21 PROCEDURE — 80048 BASIC METABOLIC PNL TOTAL CA: CPT

## 2022-09-21 PROCEDURE — 99232 SBSQ HOSP IP/OBS MODERATE 35: CPT

## 2022-09-21 PROCEDURE — 6370000000 HC RX 637 (ALT 250 FOR IP): Performed by: STUDENT IN AN ORGANIZED HEALTH CARE EDUCATION/TRAINING PROGRAM

## 2022-09-21 PROCEDURE — 36415 COLL VENOUS BLD VENIPUNCTURE: CPT

## 2022-09-21 PROCEDURE — 6360000004 HC RX CONTRAST MEDICATION

## 2022-09-21 PROCEDURE — 85025 COMPLETE CBC W/AUTO DIFF WBC: CPT

## 2022-09-21 PROCEDURE — 2580000003 HC RX 258: Performed by: STUDENT IN AN ORGANIZED HEALTH CARE EDUCATION/TRAINING PROGRAM

## 2022-09-21 PROCEDURE — 85520 HEPARIN ASSAY: CPT

## 2022-09-21 PROCEDURE — 70496 CT ANGIOGRAPHY HEAD: CPT

## 2022-09-21 PROCEDURE — 6370000000 HC RX 637 (ALT 250 FOR IP)

## 2022-09-21 RX ORDER — DABIGATRAN ETEXILATE 75 MG/1
150 CAPSULE, COATED PELLETS ORAL 2 TIMES DAILY
Status: DISCONTINUED | OUTPATIENT
Start: 2022-09-21 | End: 2022-09-21 | Stop reason: HOSPADM

## 2022-09-21 RX ORDER — NORGESTIMATE AND ETHINYL ESTRADIOL 0.25-0.035
1 KIT ORAL DAILY
Status: ON HOLD | COMMUNITY
End: 2022-09-21 | Stop reason: HOSPADM

## 2022-09-21 RX ORDER — DABIGATRAN ETEXILATE 150 MG/1
150 CAPSULE, COATED PELLETS ORAL 2 TIMES DAILY
Qty: 60 CAPSULE | Refills: 0 | Status: SHIPPED | OUTPATIENT
Start: 2022-09-22 | End: 2022-10-22

## 2022-09-21 RX ADMIN — IOPAMIDOL 75 ML: 755 INJECTION, SOLUTION INTRAVENOUS at 15:16

## 2022-09-21 RX ADMIN — ACETAMINOPHEN 650 MG: 325 TABLET, FILM COATED ORAL at 06:24

## 2022-09-21 RX ADMIN — DABIGATRAN ETEXILATE MESYLATE 150 MG: 75 CAPSULE ORAL at 14:37

## 2022-09-21 RX ADMIN — SODIUM CHLORIDE, PRESERVATIVE FREE 10 ML: 5 INJECTION INTRAVENOUS at 07:38

## 2022-09-21 ASSESSMENT — PAIN DESCRIPTION - ORIENTATION: ORIENTATION: LEFT

## 2022-09-21 ASSESSMENT — ENCOUNTER SYMPTOMS
GASTROINTESTINAL NEGATIVE: 1
COUGH: 1

## 2022-09-21 ASSESSMENT — PAIN DESCRIPTION - LOCATION: LOCATION: HAND

## 2022-09-21 ASSESSMENT — PAIN SCALES - GENERAL: PAINLEVEL_OUTOF10: 8

## 2022-09-21 NOTE — DISCHARGE SUMMARY
Hospital Medicine Discharge Summary    Patient ID: Dahlia Arriaza      Patient's PCP: No primary care provider on file. Admit Date: 9/19/2022     Discharge Date:   9/21/22    Admitting Physician: Amanda Stiles MD     Discharge Physician: Bakari Barrios MD     Discharge Diagnoses: Active Hospital Problems    Diagnosis     Dural venous sinus thrombosis [G08]      Priority: Medium    Hypertension [I10]      Priority: Medium    Venous thrombosis [I82.90]      Priority: Medium       The patient was seen and examined on day of discharge and this discharge summary is in conjunction with any daily progress note from day of discharge. Hospital Course:   36 y.o. female with PMHx of headaches associated with episodic high blood pressure, presents for headache and recent cold. She is noted to have subocclusive dural venous sinus thrombosis extending from the left transverse sinus into the sigmoid sinus and potentially upper internal jugular vein on CTA head/neck. She is admitted to ICU for further care. Dural venous sinus thrombosis:  -Neurosurgery consulted. No intervention was needed. -MRI brain without contrast showed Findings suggest a small subocclusive thrombus as seen on prior CT. Pt could not tolerate MRI brain venous. Not pursued since it would not .  -Treated with therapeutic on heparin gtt for 72 hours. CTV on 9/21 was  stable and there was no change in neuro exam.Transitioned to oral anticoagulation.  -Neurology consulted. As above. -Hematology consulted for hypercoagulable workup. Recommended to transition to pradaxa. Recommended to stop estrogen. Outpatient follow up. Hypertension, unspecified  -Episodic with headaches, states was high a year ago and controlled with weight loss. -BP controlled during admission. Irregular Menses   - Estrogen at home. Probable cause thrombosis. FU with provider about alternative treatment.         Physical Exam Performed:     BP (!) 134/90   Pulse 71   Temp 97.8 °F (36.6 °C) (Oral)   Resp 15   Wt 165 lb (74.8 kg)   LMP 09/10/2022   SpO2 97%   BMI 27.46 kg/m²       General appearance:  No apparent distress, appears stated age and cooperative. HEENT:  Normal cephalic, atraumatic without obvious deformity. Pupils equal, round, and reactive to light. Extra ocular muscles intact. Conjunctivae/corneas clear. Neck: Supple, with full range of motion. No jugular venous distention. Trachea midline. Respiratory:  Normal respiratory effort. Clear to auscultation, bilaterally without Rales/Wheezes/Rhonchi. Cardiovascular:  Regular rate and rhythm with normal S1/S2 without murmurs, rubs or gallops. Abdomen: Soft, non-tender, non-distended with normal bowel sounds. Musculoskeletal:  No clubbing, cyanosis or edema bilaterally. Full range of motion without deformity. Skin: Skin color, texture, turgor normal.  No rashes or lesions. Neurologic:  Neurovascularly intact without any focal sensory/motor deficits. Cranial nerves: II-XII intact, grossly non-focal.  Psychiatric:  Alert and oriented, thought content appropriate, normal insight      Labs: For convenience and continuity at follow-up the following most recent labs are provided:      CBC:    Lab Results   Component Value Date/Time    WBC 12.1 09/21/2022 04:51 AM    HGB 12.3 09/21/2022 04:51 AM    HCT 36.5 09/21/2022 04:51 AM     09/21/2022 04:51 AM       Renal:    Lab Results   Component Value Date/Time     09/21/2022 04:51 AM    K 4.6 09/21/2022 04:51 AM     09/21/2022 04:51 AM    CO2 24 09/21/2022 04:51 AM    BUN 7 09/21/2022 04:51 AM    CREATININE 0.6 09/21/2022 04:51 AM    CALCIUM 9.2 09/21/2022 04:51 AM         Significant Diagnostic Studies    Radiology:   CTA VENOUS HEAD W WO CONTRAST   Final Result      Stable linear subocclusive filling defect in the left transverse and sigmoid sinuses, consistent with known dural venous sinus thrombosis.              MRI BRAIN WO CONTRAST   Final Result      1. Faint signal abnormality along the left sigmoid sinus corresponding to previous CT finding. Findings suggest a small subocclusive thrombus as seen on prior CT. No acute intracranial abnormality otherwise identified. VL Extremity Venous Bilateral   Final Result      CT HEAD WO CONTRAST   Final Result      No acute intracranial hemorrhage or mass effect. XR CHEST PORTABLE   Final Result      No acute radiographic abnormality of the chest.      CTA HEAD NECK W CONTRAST   Final Result      CTA Head:   Findings suspicious for subocclusive dural venous sinus thrombosis extending from the left transverse sinus into the sigmoid sinus and potentially upper internal jugular vein. No arterial steno-occlusive disease or aneurysm. CTA Neck:   No arterial steno-occlusive disease or dissection. Consults:     IP CONSULT TO NEUROSURGERY  IP CONSULT TO HOSPITALIST  IP CONSULT TO CRITICAL CARE  IP CONSULT TO NEUROLOGY  IP CONSULT TO HEMATOLOGY    Disposition:  Home     Condition at Discharge: Stable    Discharge Instructions/Follow-up:    [facility] [phone]  Schedule an appointment as soon as possible for a visit in 3 week(s)      Martinez Wong MD  [address] [place] [address], [address]  [phone]    Schedule an appointment as soon as possible for a visit in 2 week(s)  to find out reason for clotting       Code Status:  Full Code     Activity: activity as tolerated    Diet: regular diet      Discharge Medications:     Current Discharge Medication List             Details   dabigatran (PRADAXA) 150 MG capsule Take 1 capsule by mouth 2 times daily  Qty: 60 capsule, Refills: 0             Time Spent on discharge is more than 30 minutes in the examination, evaluation, counseling and review of medications and discharge plan.       Signed:    Marisela Apley, MD   9/21/2022      Thank you No primary care provider on file. for the opportunity to be involved in this patient's care. If you have any questions or concerns please feel free to contact me at [phone].

## 2022-09-21 NOTE — PROGRESS NOTES
Hospitalist Progress Note      PCP: No primary care provider on file. Date of Admission: 9/19/2022    Hospital Course:    36 y.o. female with PMHx of headaches associated with episodic high blood pressure, presents for headache and recent cold. She is noted to have subocclusive dural venous sinus thrombosis extending from the left transverse sinus into the sigmoid sinus and potentially upper internal jugular vein on CTA head/neck. She is admitted to ICU for further care. Subjective:    Headaches improved. She notes persistent blurry vision. No issues with gait. Medications:  Reviewed    Infusion Medications    heparin (PORCINE) Infusion 14 Units/kg/hr (09/21/22 0737)    sodium chloride       Scheduled Medications    sodium chloride flush  5-40 mL IntraVENous 2 times per day     PRN Meds: sodium chloride flush, sodium chloride, ondansetron **OR** ondansetron, polyethylene glycol, acetaminophen **OR** acetaminophen, labetalol, hydrALAZINE      Intake/Output Summary (Last 24 hours) at 9/21/2022 1158  Last data filed at 9/21/2022 0630  Gross per 24 hour   Intake 457 ml   Output 1100 ml   Net -643 ml       Physical Exam Performed:    /87   Pulse 80   Temp 98.3 °F (36.8 °C) (Oral)   Resp 16   Wt 165 lb (74.8 kg)   LMP 09/10/2022   SpO2 97%   BMI 27.46 kg/m²     General appearance: No apparent distress, appears stated age and cooperative. HEENT: Pupils equal, round, and reactive to light. Conjunctivae/corneas clear. Neck: Supple, with full range of motion. No jugular venous distention. Trachea midline. Respiratory:  Normal respiratory effort. Clear to auscultation, bilaterally without Rales/Wheezes/Rhonchi. Cardiovascular: Regular rate and rhythm with normal S1/S2 without murmurs, rubs or gallops. Abdomen: Soft, non-tender, non-distended with normal bowel sounds. Musculoskeletal: No clubbing, cyanosis or edema bilaterally. Full range of motion without deformity.   Skin: Skin color, texture, turgor normal.  No rashes or lesions. Neurologic:  grossly non-focal.  Psychiatric: Alert and oriented, thought content appropriate, normal insight      Labs:   Recent Labs     09/19/22  1226 09/20/22  0439 09/21/22  0451   WBC 9.9 11.3* 12.1*   HGB 12.1 11.6* 12.3   HCT 36.7 35.0* 36.5    233 248     Recent Labs     09/19/22  1226 09/20/22  0439 09/21/22  0451    137 138   K 4.3 3.8 4.6    100 101   CO2 24 25 24   BUN 7 9 7   CREATININE <0.5* 0.7 0.6   CALCIUM 9.4 9.3 9.2     Recent Labs     09/19/22  1226   AST 14*   ALT 12   BILITOT 0.3   ALKPHOS 65     No results for input(s): INR in the last 72 hours. Recent Labs     09/19/22  1226   TROPONINI <0.01       Urinalysis:      Lab Results   Component Value Date/Time    NITRU Negative 08/27/2019 09:08 PM    WBCUA 0-3 10/28/2012 07:00 PM    BACTERIA Few 10/28/2012 07:00 PM    RBCUA 0-3 10/28/2012 07:00 PM    BLOODU Negative 08/27/2019 09:08 PM    SPECGRAV 1.020 08/27/2019 09:08 PM    GLUCOSEU Negative 08/27/2019 09:08 PM       Radiology:  MRI BRAIN WO CONTRAST   Final Result      1. Faint signal abnormality along the left sigmoid sinus corresponding to previous CT finding. Findings suggest a small subocclusive thrombus as seen on prior CT. No acute intracranial abnormality otherwise identified. VL Extremity Venous Bilateral   Final Result      CT HEAD WO CONTRAST   Final Result      No acute intracranial hemorrhage or mass effect. XR CHEST PORTABLE   Final Result      No acute radiographic abnormality of the chest.      CTA HEAD NECK W CONTRAST   Final Result      CTA Head:   Findings suspicious for subocclusive dural venous sinus thrombosis extending from the left transverse sinus into the sigmoid sinus and potentially upper internal jugular vein. No arterial steno-occlusive disease or aneurysm. CTA Neck:   No arterial steno-occlusive disease or dissection.          MRV HEAD W WO CONTRAST    (Results Pending) Assessment/Plan:    Active Hospital Problems    Diagnosis     Dural venous sinus thrombosis [G08]      Priority: Medium    Hypertension [I10]      Priority: Medium    Venous thrombosis [I82.90]      Priority: Medium     Dural venous sinus thrombosis:  -Neurosurgery consulted. No intervention planned. -MRI brain without contrast and MRV with and without contrast  showed Findings suggest a small subocclusive thrombus as seen on prior CT  -Plan for therapeutic on heparin gtt for 72 hours. If CTV stable and there is no change in neuro exam, can transition to [address]  -Hematology consulted for hypercoagulable workup    Hypertension, unspecified  -Episodic with headaches, states was high a year ago and controlled with weight loss. -BP controlled    Irregular Menses   - Estrogen at home. Could cause thrombosis. DVT Prophylaxis: heparin gtt. Diet: ADULT DIET; Regular; Low Sodium (2 gm)  Code Status: Full Code    PT/OT Eval Status: as needed    Dispo - Plan for floor pending MRV and heparin gtt for 72hrs.     Nayana Muller MD

## 2022-09-21 NOTE — PROGRESS NOTES
ICU Progress Note    Admit Date: 9/19/2022  Day: 3  Vent Day:    IV Access:    IV Fluids:    Vasopressors:    Antibiotics:    Diet:  ADULT DIET; Regular; Low Sodium (2 gm)    CC: Headache    Interval History: No acute overnight events. Anxiety during MRI's despite treatment - studies incomplete. MRI brain still resulted. Heparin sub-therapeutic to supra-therapeutic. New PIV placed as L hand PIV infiltrated    HPI: Pavithra Mccarthy is a 36 y.o. female with PMHx of headaches associated with episodic high blood pressure, presents for headache and recent cold. Patient states she had a headache starting on August 13th or 14th for 1 week of duration, and at that time her blood pressure elevated. She also states she had eye strain, with no real specific vision deficits but described it as needing to strain or squint to see clearly. She states she had an episode like this about a year ago, too. For this episode, she took magnesium and relaxed and since then her blood pressure she reports was normal. For the past 1-2 weeks, she complains of sinus pressure, sore throat, non-productive cough, and having a cold. She says she had a gland swollen on the right side of her neck. For this cold, she had extra prednisone laying around, and reportedly took about 5 pills of unknown dose. She states she took one pill on Wednesday (9/14/22), Thursday, none Friday through Sunday as she traveled to [facility], and then one pill this morning (9/19/22). She states she took a COVID test everyday and all were negative. She is Vaccinated and boosted but the last booster was [place] while ago. \" She complains of a headache that is pressure like, on the right side on the top of her head and the back, and is a 5/10 in intensity. She continues to have to strain for visual acuity. The only medicine she regularly takes is her Estrogen birth control. She denies other supllemnts or medications.  She otherwise denies other visual deficits, CP, SOB, bowel or urinary changes, balance changes, or any other complaints. At [facility] ED, BP was 170/90's. CTA head \"Findings suspicious for subocclusive dural venous sinus thrombosis extending from the left transverse sinus into the sigmoid sinus and potentially upper internal jugular vein. \" CT HEAD negative for acute hemorrhage, CXR negative. Transferred to [facility] ICU for Nsurg eval.          Review of Systems   Constitutional: Negative. HENT: Negative. Respiratory:  Positive for cough. Cardiovascular: Negative. Gastrointestinal: Negative. Genitourinary: Negative. Neurological: Negative. Hematological: Negative. Psychiatric/Behavioral: Negative.        Objective     Scheduled Meds:     sodium chloride flush  5-40 mL IntraVENous 2 times per day     Continuous Infusions:     heparin (PORCINE) Infusion Stopped (09/21/22 0622)    sodium chloride       PRN Meds:  sodium chloride flush, sodium chloride, ondansetron **OR** ondansetron, polyethylene glycol, acetaminophen **OR** acetaminophen, labetalol, hydrALAZINE    Vitals:   T-max:  Patient Vitals for the past 8 hrs:   BP Temp Temp src Pulse Resp SpO2   09/21/22 0600 113/68 -- -- 70 16 --   09/21/22 0500 117/69 -- -- 76 17 --   09/21/22 0400 120/74 98.3 °F (36.8 °C) Oral 72 (!) 9 96 %   09/21/22 0300 106/64 -- -- 66 15 --   09/21/22 0200 114/69 -- -- 71 11 --   09/21/22 0100 122/71 -- -- 75 17 --   09/21/22 0000 113/68 98.4 °F (36.9 °C) Oral 68 17 --         Intake/Output Summary (Last 24 hours) at 9/21/2022 0705  Last data filed at 9/21/2022 0600  Gross per 24 hour   Intake 457 ml   Output 1100 ml   Net -643 ml       Access:   Central:  Day                                  Peripheral:  Day   Arterial:  Day                             Martinez Day:  NGT Day:                                           ETT Day:  Vent Settings:  RA, assist control, or bilevel/PEEP /FiO2 /RR / TV      / / /     ABGs:  No results for input(s): PHART, MRF2OKA, PO2ART, XTI6NVG, BEART, THGBART, E1LHPHIO, CPH5PCJ in the last 72 hours. Physical Exam:  Physical Exam  Constitutional:       Appearance: Normal appearance. HENT:      Mouth/Throat:      Mouth: Mucous membranes are moist.   Eyes:      Extraocular Movements: Extraocular movements intact. Cardiovascular:      Rate and Rhythm: Normal rate and regular rhythm. Pulses: Normal pulses. Heart sounds: Normal heart sounds. Pulmonary:      Effort: Pulmonary effort is normal.      Breath sounds: Normal breath sounds. Abdominal:      General: Abdomen is flat. Palpations: Abdomen is soft. Musculoskeletal:      Cervical back: Normal range of motion. Neurological:      Mental Status: She is alert and oriented to person, place, and time. Labs:  CBC:   Recent Labs     09/19/22  1226 09/20/22  0439 09/21/22  0451   WBC 9.9 11.3* 12.1*   HGB 12.1 11.6* 12.3   HCT 36.7 35.0* 36.5    233 248   MCV 85.4 86.0 86.0     Renal:    Recent Labs     09/19/22  1226 09/20/22  0439 09/21/22  0451    137 138   K 4.3 3.8 4.6    100 101   CO2 24 25 24   BUN 7 9 7   CREATININE <0.5* 0.7 0.6   GLUCOSE 110* 105* 107*   CALCIUM 9.4 9.3 9.2   ANIONGAP 9 12 13     Hepatic:   Recent Labs     09/19/22  1226   AST 14*   ALT 12   BILITOT 0.3   PROT 7.8   LABALBU 3.9   ALKPHOS 65     Troponin:   Recent Labs     09/19/22  1226   TROPONINI <0.01     BNP: No results for input(s): BNP in the last 72 hours. Lipids: No results for input(s): CHOL, HDL in the last 72 hours. Invalid input(s): LDLCALCU, TRIGLYCERIDE  INR: No results for input(s): INR in the last 72 hours. Lactate: No results for input(s): LACTATE in the last 72 hours. Cultures:      Radiology:   VL Extremity Venous Bilateral   Final Result      CT HEAD WO CONTRAST   Final Result      No acute intracranial hemorrhage or mass effect.                XR CHEST PORTABLE   Final Result      No acute radiographic abnormality of the chest.      CTA HEAD NECK W CONTRAST   Final Result      CTA Head:   Findings suspicious for subocclusive dural venous sinus thrombosis extending from the left transverse sinus into the sigmoid sinus and potentially upper internal jugular vein. No arterial steno-occlusive disease or aneurysm. CTA Neck:   No arterial steno-occlusive disease or dissection. MRV HEAD W WO CONTRAST    (Results Pending)   MRI BRAIN WO CONTRAST    (Results Pending)         Assessment/Plan   36 y.o. female, who presented for headache and recent respiratory cold. She was found to have dural venous sinus thrombosis. Dural Venous Sinus Thrombosis  CTA head \"Findings suspicious for subocclusive dural venous sinus thrombosis extending from the left transverse sinus into the sigmoid sinus and potentially upper internal jugular vein. \"  On estrogen at home for about a year  Smokes Hookah \"once in a blue moon\", but no regular tobacco use  Recent respiratory illness, COVID negative  No known family Hx of thrombosis  Risk for thrombosis associated with estrogen use. -Therapeutic Heparin gtt (no bolus)  -Anti-Xa monitoring Heparin  -BP control with SBP goal <160  -Doppler BL LE to r/o: Negative  -Q2 neuro checks while Heparin subtherapeutic. Obtain CTV if change in Neuro exam  -Neuro recs  -Neurosurgery recs  -is now complaining of less-sensitive R side of whole body/heavier sensitivity L side, but reflectively was mixed on physical exam     Hypertension, unspecified  -Episodic with headaches, states was high a year ago and controlled with weight loss. Outpatient visits appear to be in the 902'P systolic  Blood pressure in hospital has been <880 systolic     Irregular Menses   - Estrogen at home, about 1 year     Code Status: FULL  FEN: ADULT DIET; Regular;  Low Sodium (2 gm)  PPX: Heparin  DISPO: ICU    Bill Pena MD  PGY-1  09/21/22  7:05 AM    This patient has been staffed and discussed with Dr. Barbie Moffett    Patient examined, findings as discussed with  Margaret Ibarra with assessment and plan. Continuing anticoagulation for venous sinus thrombosis. No new findings on MRI scan. Blood pressure remains controlled without medication.

## 2022-09-21 NOTE — PROGRESS NOTES
NEUROLOGY / NEUROCRITICAL CARE PROGRESS NOTE       Patient Name: Maggie Dave YOB: 1980   Sex: Female Age: 36 yrs     CC / Reason for Consult: Dural venous thrombosis     Interval Hx / Changes over last 24 hours:   -Had MRI of brain last evening, unable to complete MRV d/t anxiety/claustrophobia  -Vision still feels out of focus but not any worse or better  -No headaches overnight  -Having very mild decrease in temperature sensation on right side   -Supra therapeutic anti-Xa this morning     ROS: +blurry vision. +decreased sensation on right     HISTORY   Admission HPI:   Maggie Dave is a 36 y.o. y/o female with no significant medical history who presented to [facility] ED complaints of headache and visual difficulties. She endorses she started having a headache about a month ago that lasted about a week. She described it as aching/pressure-like, all over her head and rated at 5/10. She checked her blood pressure during this time and it was high, reported was 170's/110. She does not have a history of hypertension. Her headache was unrelieved by Tylenol or at home remedies and resolved spontaneously when her blood pressure returned back to normal.      However, over the last two weeks or so, her headache returned in similar fashion except this time she was feeling under the weather and was accompanied by visual changes, sinus pressure, sore throat and a swollen lymph node in her right neck. She felt that her vision was blurry and that she had to squint and strain more to focus. Nothing made her vision better or worse. She does not wear glasses or contacts. She took some spare Prednisone she had laying around for the few days prior leading up to admission but this did not help. Given the persistence of her symptoms, her friends and family urged her to seek medical evaluation.  She denies any other focal neurological deficits such as lateralizing weakness, double vision, numbness, tingling, speech or gait disturbance. Her only home medication is oral contraceptives with estrogen which she has been on for about the last year. She had COVID twice, most recently in December of 2021, has been Vaccinated and received a Booster dose in October of 2021. She denies recent head injury or  personal/family history of clotting disorders, miscarriages, malignancy or connective tissue disorder. On presentation to [address] [place] [address] ED, VS significant for 'J systolic other VSS. CTA head in outside ED showed findings suspicious for subocclusive dural venous sinus thrombosis extending from the left transverse sinus into the sigmoid sinus and potentially upper internal jugular vein. CT of the head was without hemorraghic. She was transferred to [facility] for ICU care and NSGY evaluation recommended. Upon arrival to [facility], her BP was in the 170's/90's. She was still complaining of a mild, pressure-like headache rated at 5/10 but overall felt well. She was still experiencing some visual strain that was unchanged but no photophobia. She was started on a heparin gtt. PMH Past Medical History:   Diagnosis Date    Bacterial vaginosis 08/2019      Allergies Allergies   Allergen Reactions    Codeine Hives      Diet ADULT DIET; Regular; Low Sodium (2 gm)   Isolation No active isolations     CURRENT SCHEDULED MEDICATIONS   Inpatient Medications     sodium chloride flush, 5-40 mL, IntraVENous, 2 times per day   Infusions    heparin (PORCINE) Infusion 14 Units/kg/hr (09/21/22 0737)    sodium chloride        Antibiotics   Recent Abx Admin        No antibiotic orders with administrations found.                       LABS   Metabolic Panel Recent Labs     09/19/22  1226 09/20/22  0439 09/21/22  0451    137 138   K 4.3 3.8 4.6    100 101   CO2 24 25 24   BUN 7 9 7   CREATININE <0.5* 0.7 0.6   GLUCOSE 110* 105* 107*   CALCIUM 9.4 9.3 9.2   LABALBU 3.9  --   --    ALKPHOS 65  --   --    ALT 12  --   --    AST 14*  --   -- CBC / Coags Recent Labs     09/19/22  1226 09/20/22  0439 09/21/22  0451   WBC 9.9 11.3* 12.1*   RBC 4.29 4.07 4.24   HGB 12.1 11.6* 12.3   HCT 36.7 35.0* 36.5    233 248      Other Recent Labs     09/19/22  1510   COVID19 Not Detected     No results for input(s): PHENYTOIN, KEPPRA, LACOSA, LAMO, VALPROATE, LACTSEPSIS, LACTA in the last 72 hours. Latest Reference Range & Units 9/20/22 15:47 9/20/22 20:59 9/21/22 04:51 9/21/22 05:42   Anti-XA Unfrac Heparin 0.30 - 0.70 IU/mL 0.35 0.48 >1.10 (HH) >1.10 (HH)     DIAGNOSTICS   IMAGES:  Images personally reviewed and agree w/ radiology interpretation. Head CT w/o Contrast: 9/19/22; 13:22  Impression       No acute intracranial hemorrhage or mass effect. CTA of Head / Neck w/ Contrast: 9/19/22; 13:07  Impression       CTA Head:   Findings suspicious for subocclusive dural venous sinus thrombosis extending from the left transverse sinus into the sigmoid sinus and potentially upper internal jugular vein. No arterial steno-occlusive disease or aneurysm. CTA Neck:   No arterial steno-occlusive disease or dissection. MRI Brain w/o Contrast:    Impression       1. Faint signal abnormality along the left sigmoid sinus corresponding to previous CT finding. Findings suggest a small subocclusive thrombus as seen on prior CT. No acute intracranial abnormality otherwise identified.      MRV Brain w/ and without Contrast: patient unable to complete       PHYSICAL EXAMINATION     PHYSICAL EXAM:  Vitals:    09/21/22 0500 09/21/22 0600 09/21/22 0700 09/21/22 0740   BP: 117/69 113/68 115/70    Pulse: 76 70 66    Resp: 17 16 15    Temp:    98.2 °F (36.8 °C)   TempSrc:    Oral   SpO2:       Weight:           General: Alert, no distress, well-nourished  Neurologic  Mental status:   orientation to person, place, time, situation   Attention intact as able to attend well to the exam     Language fluent in conversation   Comprehension intact; follows simple commands    Cranial nerves:   CN2: Visual fields full w/o extinction on confrontational testing   CN 3,4,6: Pupils equal and reactive to light, extraocular muscles intact  CN5: Facial sensation symmetric   CN7: Face symmetric  CN8: Hearing symmetric to spoken voice  CN9: Palate elevated symmetrically  CN11: Traps full strength on shoulder shrug  CN12: Tongue midline with protrusion    Motor Exam:   R  L    Deltoid 5  5   Biceps 5 5   Triceps 5 5   Wrist extension  5 5   Interossei 5 5      R  L    Hip flexion  5  5   Hip extension  5 5   Knee flexion  5 5   Knee extension  5 5   Ankle dorsiflexion  5 5   Ankle plantar flexion  5 5     Sensory: light touch intact and symmetric in all 4 extremities. No sensory extinction on bilateral simultaneous stimulation. Very mild decreased in temperature sensation on right face and right arm. States hands feel \"colder\" on left side. Cerebellar/coordination: finger nose finger normal without ataxia  Tone: normal in all 4 extremities    OTHER SYSTEMS:  Cardiovascular: Warm, appears well perfused   Respiratory: Easy, non-labored respiratory pattern   Abdominal: Abdomen is without distention   Extremities: Upper and lower extremities are atraumatic in appearance without deformity. No swelling or erythema. ASSESSMENT & RECOMMENDATIONS   Assessment:  -35 year old female with no significant medical history on oral contraceptives for the last year who presented with persistent headache and visual changes for about the last month accompanied by hypertension and recent cold-like symptoms.      -In setting of advanced age on oral contraceptives with estrogen, I believe this may have played a role in her development of venous sinus thrombosis. -Though this may have been provoked by oral contraceptives, she should still be worked up from a hematological perspective for hypercoagulable disorders.     Plan:  - Q4 neuro checks, can transfer to floor  - MRV discontinued as patient was not able to tolerate an wouldn't    - CTV ordered for now  - Discontinued Heparin drip  - Start Pradaxa today per Hematology recommendation   - Per Heme/Onc, will follow up outpatient for hypercoagulable workup.   - Follow up with neurology in two weeks as outpatient. MINI Martinez - [facility]   Neurology & Neurocritical Care   Neurology Line: [phone]  [employer]: [facility] Neurology & Neuro Critical Care NPs  9/21/2022 7:37 AM    I spent 25 minutes in the care of this patient. Over 50% of that time was in face-to-face counseling regarding disease process, diagnostic testing, preventative measures, and answering patient and family questions.

## 2022-09-21 NOTE — CONSULTS
[address]               [address]                                  CONSULTATION    PATIENT NAME: Nohelia Doss                      :        1980  MED REC NO:   [id number]                          ROOM:       65  ACCOUNT NO:   [de-identified]                           ADMIT DATE: 2022  PROVIDER:     Senthil Mccord MD    CONSULT DATE:  2022    REASON FOR REFERRAL:  Sinus vein thrombosis, evaluation for  thrombophilia. HISTORY OF PRESENT ILLNESS:  The patient is a 69-year-old woman with no  significant past medical history. She was seen through the emergency  department at [facility] for headaches dating back approximately one to  two weeks. That progressively intensified, and hence, was seen through  the emergency department on 2022. Further evaluation from that  later revealed findings suspicious for subocclusive dural venous sinus  thrombosis extending from the left transverse sinus into the sigmoid  sinus and potentially upper internal jugular vein. She was started on  heparin drip and was transferred to [facility] for further  evaluation. With these findings, I was asked to consult on the patient  and make recommendations regarding her thrombotic episode. She has no known history of thrombophilia. FAMILY HISTORY:  Negative for the same. PAST MEDICAL HISTORY:  None. PAST SURGICAL HISTORY:  She had  and tubal ligation. SOCIAL HISTORY:  She is single, has three children. Lives at home. She  denies illicit drug use. She denies alcohol abuse. She smokes  occasionally preferably a hookah. FAMILY HISTORY:  Negative for malignancy, bleeding or clotting  diathesis. ALLERGIES:  CODEINE produces hives. REVIEW OF SYSTEMS:  She reports approximately 25-pound weight loss in  the last six months which she reports as intentional.  She has modified  her diet.   She has stopped all soda drinks and is increasing her  physical activity. The remainder of her 10-point review of systems is  negative. PHYSICAL EXAMINATION:  GENERAL:  She is a young woman, alert and oriented and in no acute  distress. VITAL SIGNS:  Currently, 114/70, heart rate of 75, respiratory is 13,  Tmax is 98. 3. HEENT:  Pupils are equal and reactive. Oral mucosa are intact. RESPIRATORY:  Clear to auscultation bilaterally. CARDIOVASCULAR:  Regular rate and rhythm. No rubs. No murmurs. ABDOMEN:  No palpable hepatosplenomegaly or masses. EXTREMITIES:  No edema. SKIN:  No rashes or dermatitis. LABORATORY DATA:  WBC is 12 with mostly neutrophils. Hemoglobin is 12,  platelets are 324. Chemistry is within normal limits. TSH is 1.2. ASSESSMENT AND PLAN:  A 50-year-old woman with no significant past  medical history now with sinus vein thrombosis with no clear etiology  other than being on estrogen therapy. Etiology is unclear, but no  obvious underlying culprits can be identified. In any case, she will  need anticoagulation with heparin and later transition to preferably  Pradaxa. In terms of thrombophilia workup, _____ will be ordered as an  outpatient. I will see her in my office in three to four weeks and get  these labs ordered. We discussed age-appropriate screening, routine health maintenance  through her primary care physician. We discussed the smoking cessation  and avoiding birth control with estrogen-based therapy. Thank you for this interesting consult. Please do not hesitate to  contact me for questions or concerns regarding the patient's evaluation.         Tamara Bar MD    D: 09/21/2022 10:25:11       T: 09/21/2022 12:17:44     REGINO  Job#: [id number]     Doc#: [id number]    CC:

## 2022-09-21 NOTE — CARE COORDINATION
Case management is following for discharge planning. The chart was reviewed. Ms. Kirk Calzada remains in the ICU on a Heparin gtt. Pulm, neuro and NSGY are following. Will address disposition once she is more medically stable.     Electronically signed by JUANJO Jenkins RN-[facility]  Case Management  [phone]

## 2022-09-21 NOTE — PROGRESS NOTES
Prior to discharge removal of both IV's with no complications. Discussed discharge instructions and medications with Pt and answered all questions. Pt by wheelchair to drop off downstairs with no complications.      Tyna Boeck, RN

## 2022-09-21 NOTE — CONSULTS
NEUROSURGERY CONSULT NOTE    Esteban Councilman  [id number]   1980    Requesting physician: Chantel Ortiz MD    Reason for consultation: Dural Venous Sinus Thrombosis    History of present illness: Patient is a 36 y.o. female that  has a past medical history of Bacterial vaginosis (2019). Patient presented on 2022 to [facility] ED c/o persistent headache and visual changes for about the last month. She complains of a headache that is pressure like, on the right side on the top of her head and the back, and is a 5/10 in intensity. She continues to have to strain for visual acuity. The only medicine she regularly takes is her Estrogen birth control. CT Head negative, but CTA Head/Neck suspicious for subocclusive dural venous sinus thrombosis extending from the left transverse sinus into the sigmoid sinus and potentially upper internal jugular vein. ROS:   GENERAL:  Denies fever or recent illness. Denies weight changes   EYES:  Denies vision change or diplopia  EARS:  Denies hearing loss  CARDIAC:  Denies chest pain  RESPIRATORY:  Denies shortness of breath  SKIN:  Denies rash or lesions   HEM:  Denies excessive bruising  PSYCH:  Denies anxiety or depression  NEURO:  Endorses headache and blurry vision; Denies numbness or tingling or lateralizing weakness   :  Denies urinary difficulty  GI: Denies nausea, vomiting, diarrhea or constipation  MUSCULOSKELETAL:  No arthralgias    Allergies   Allergen Reactions    Codeine Hives       Past Medical History:   Diagnosis Date    Bacterial vaginosis 2019        Past Surgical History:   Procedure Laterality Date     SECTION      TUBAL LIGATION         Social History     Occupational History    Not on file   Tobacco Use    Smoking status: Never    Smokeless tobacco: Never   Substance and Sexual Activity    Alcohol use: Yes     Comment: occ    Drug use: No    Sexual activity: Not Currently     Partners: Male        History reviewed.  No Optic discs: Not tested  Pulmonary: unlabored respiratory effort  Cardiovascular:  Warm well perfused. No peripheral edema  Gastrointestinal: abdomen soft, NT, ND    Neurological:  Mental Status: Awake, alert, oriented x 4, speech clear and appropriate  Attention: Intact  Language: No aphasia or dysarthria noted  Sensation: Intact to all extremities to light touch  Coordination: Intact    Cranial Nerves:  II: Visual acuity not tested, denies new visual changes / diplopia  III, IV, VI: PERRL, 3 mm bilaterally, EOMI, no nystagmus noted  V: Facial sensation intact bilaterally to touch  VII: Face symmetric  VIII: Hearing intact bilaterally to spoken voice  IX: Palate movement equal bilaterally  XI: Shoulder shrug equal bilaterally  XII: Tongue midline    Musculoskeletal:   Gait: Not tested   Assist devices: None   Tone: Normal  Motor strength:    Right  Left    Right  Left    Deltoid  5 5  Hip Flex  5 5   Biceps  5 5  Knee Extensors  5 5   Triceps  5 5  Knee Flexors  5 5   Wrist Ext  5 5  Ankle Dorsiflex. 5 5   Wrist Flex  5 5  Ankle Plantarflex. 5 5   Handgrip  5 5  Ext Vazquez Longus  5 5   Thumb Ext  5 5         Radiological Findings:    I personally reviewed the patient's imaging which consists of a CT head and CTV dated 9/19/2022. This demonstrates evidence of subocclusive dural venous sinus thrombosis in the left transverse sinus and sigmoid sinus. No intracranial hemorrhage. Labs:  Recent Labs     09/20/22  0439   WBC 11.3*   HGB 11.6*   HCT 35.0*            Recent Labs     09/20/22  0439      K 3.8      CO2 25   BUN 9   CREATININE 0.7   GLUCOSE 105*   CALCIUM 9.3         No results for input(s): PROTIME, INR, APTT in the last 72 hours.     Patient Active Problem List    Diagnosis Date Noted    Dural venous sinus thrombosis 09/20/2022    Hypertension 09/20/2022    Venous thrombosis 09/19/2022       Assessment:  Patient is a 36 y.o. female w/persistent headache and visual changes for about the last month accompanied by hypertension. Plan:  No emergent neurosurgical intervention indicated  Neurologic exams frequency: Defer to Neuro Critical Care  For change in exam MUST contact neurosurgery team along with critical care or primary team  Dural Venous Thrombosis:  - CTA Head/Neck suspicious for subocclusive dural venous sinus thrombosis extending from the left transverse sinus into the sigmoid sinus and potentially upper internal jugular vein. - Low Dose No Bolus Heparin gtt  - Neuro Critical Care consulted and will manage Heparin therapy  - Agree with hematology consult for hypercoagulable workup and recommendations for future PO anticoagulant therapy  Thank you for consult. Will follow peripherally while inpatient.   Please call with any questions or decline in neurological status      Roxanne Tapia MD, PhD  [address], [address], [address] [phone] (c), [phone] (o)

## 2022-09-22 ENCOUNTER — CARE COORDINATION (OUTPATIENT)
Dept: CASE MANAGEMENT | Age: 42
End: 2022-09-22

## 2022-09-22 NOTE — CARE COORDINATION
[facility] Transitions Initial Follow Up Call    Call within 2 business days of discharge: Yes    Patient: Bindu Babb Patient : 1980   MRN: <[id number]>  Reason for Admission: Venous thrombosis Discharge Date: 22 RARS: Readmission Risk Score: 4.4      Last Discharge [facility]       Date Complaint Diagnosis Description Type Department Provider    22 Blurred Vision Dural venous sinus thrombosis ED to Hosp-Admission (Discharged) (ADMIT) [address] ICU Kasia Haskins MD; Socorro Talbert. .. Spoke with: [facility] attempted outreach 24 hr hospital discharge for care transition follow up. Left HIPPA compliant message and contact information for call back. Facility: The [address] [place] [address] Transitions 24 Hour Call    Care Transitions Interventions         Follow Up  No future appointments.     Carolann Cerda LPN

## 2022-09-23 ENCOUNTER — CARE COORDINATION (OUTPATIENT)
Dept: CASE MANAGEMENT | Age: 42
End: 2022-09-23

## 2022-09-23 DIAGNOSIS — G08 DURAL VENOUS SINUS THROMBOSIS: Primary | ICD-10-CM

## 2022-09-30 ENCOUNTER — CARE COORDINATION (OUTPATIENT)
Dept: CASE MANAGEMENT | Age: 42
End: 2022-09-30

## 2024-07-13 ENCOUNTER — APPOINTMENT (OUTPATIENT)
Dept: CT IMAGING | Age: 44
DRG: 392 | End: 2024-07-13
Payer: COMMERCIAL

## 2024-07-13 ENCOUNTER — HOSPITAL ENCOUNTER (INPATIENT)
Age: 44
LOS: 3 days | Discharge: HOME | DRG: 392 | End: 2024-07-16
Attending: EMERGENCY MEDICINE | Admitting: SURGERY
Payer: COMMERCIAL

## 2024-07-13 DIAGNOSIS — K57.32 DIVERTICULITIS OF COLON: Primary | ICD-10-CM

## 2024-07-13 PROBLEM — K57.20 DIVERTICULITIS OF COLON WITH PERFORATION: Status: ACTIVE | Noted: 2024-07-13

## 2024-07-13 LAB
ALBUMIN SERPL-MCNC: 3.7 G/DL (ref 3.4–5)
ALP SERPL-CCNC: 112 U/L (ref 40–129)
ALT SERPL-CCNC: 25 U/L (ref 10–40)
ANION GAP SERPL CALCULATED.3IONS-SCNC: 11 MMOL/L (ref 3–16)
AST SERPL-CCNC: 40 U/L (ref 15–37)
BASE EXCESS BLDV CALC-SCNC: 1.2 MMOL/L (ref -2–3)
BASOPHILS # BLD: 0 K/UL (ref 0–0.2)
BASOPHILS NFR BLD: 0.3 %
BILIRUB DIRECT SERPL-MCNC: <0.2 MG/DL (ref 0–0.3)
BILIRUB INDIRECT SERPL-MCNC: ABNORMAL MG/DL (ref 0–1)
BILIRUB SERPL-MCNC: 0.4 MG/DL (ref 0–1)
BILIRUB UR QL STRIP.AUTO: NEGATIVE
BUN SERPL-MCNC: 8 MG/DL (ref 7–20)
CALCIUM SERPL-MCNC: 9.2 MG/DL (ref 8.3–10.6)
CHLORIDE SERPL-SCNC: 101 MMOL/L (ref 99–110)
CLARITY UR: CLEAR
CO2 BLDV-SCNC: 27 MMOL/L
CO2 SERPL-SCNC: 23 MMOL/L (ref 21–32)
COHGB MFR BLDV: 0.9 % (ref 0–1.5)
COLOR UR: YELLOW
CREAT SERPL-MCNC: 0.7 MG/DL (ref 0.6–1.1)
DEPRECATED RDW RBC AUTO: 14.8 % (ref 12.4–15.4)
DO-HGB MFR BLDV: 7 %
EOSINOPHIL # BLD: 0.3 K/UL (ref 0–0.6)
EOSINOPHIL NFR BLD: 2.4 %
FLUAV RNA RESP QL NAA+PROBE: NOT DETECTED
FLUBV RNA RESP QL NAA+PROBE: NOT DETECTED
GFR SERPLBLD CREATININE-BSD FMLA CKD-EPI: >90 ML/MIN/{1.73_M2}
GLUCOSE SERPL-MCNC: 112 MG/DL (ref 70–99)
GLUCOSE UR STRIP.AUTO-MCNC: NEGATIVE MG/DL
HCG UR QL: NEGATIVE
HCO3 BLDV-SCNC: 25.9 MMOL/L (ref 24–28)
HCT VFR BLD AUTO: 35 % (ref 36–48)
HGB BLD-MCNC: 11.7 G/DL (ref 12–16)
HGB UR QL STRIP.AUTO: NEGATIVE
KETONES UR STRIP.AUTO-MCNC: NEGATIVE MG/DL
LACTATE BLDV-SCNC: 0.6 MMOL/L (ref 0.4–2)
LEUKOCYTE ESTERASE UR QL STRIP.AUTO: NEGATIVE
LIPASE SERPL-CCNC: 20 U/L (ref 13–60)
LYMPHOCYTES # BLD: 1.7 K/UL (ref 1–5.1)
LYMPHOCYTES NFR BLD: 14 %
MCH RBC QN AUTO: 29.3 PG (ref 26–34)
MCHC RBC AUTO-ENTMCNC: 33.4 G/DL (ref 31–36)
MCV RBC AUTO: 87.7 FL (ref 80–100)
METHGB MFR BLDV: 0.2 % (ref 0–1.5)
MONOCYTES # BLD: 0.9 K/UL (ref 0–1.3)
MONOCYTES NFR BLD: 7.2 %
NEUTROPHILS # BLD: 9.2 K/UL (ref 1.7–7.7)
NEUTROPHILS NFR BLD: 76.1 %
NITRITE UR QL STRIP.AUTO: NEGATIVE
PCO2 BLDV: 40.3 MMHG (ref 41–51)
PH BLDV: 7.42 [PH] (ref 7.35–7.45)
PH UR STRIP.AUTO: 7 [PH] (ref 5–8)
PLATELET # BLD AUTO: 280 K/UL (ref 135–450)
PMV BLD AUTO: 10.1 FL (ref 5–10.5)
PO2 BLDV: 64.1 MMHG (ref 25–40)
POTASSIUM SERPL-SCNC: 4.4 MMOL/L (ref 3.5–5.1)
PROT SERPL-MCNC: 7.3 G/DL (ref 6.4–8.2)
PROT UR STRIP.AUTO-MCNC: NEGATIVE MG/DL
RBC # BLD AUTO: 3.99 M/UL (ref 4–5.2)
SAO2 % BLDV: 93 %
SARS-COV-2 RNA RESP QL NAA+PROBE: NOT DETECTED
SODIUM SERPL-SCNC: 135 MMOL/L (ref 136–145)
SP GR UR STRIP.AUTO: 1.02 (ref 1–1.03)
UA COMPLETE W REFLEX CULTURE PNL UR: NORMAL
UA DIPSTICK W REFLEX MICRO PNL UR: NORMAL
URN SPEC COLLECT METH UR: NORMAL
UROBILINOGEN UR STRIP-ACNC: 0.2 E.U./DL
WBC # BLD AUTO: 12 K/UL (ref 4–11)

## 2024-07-13 PROCEDURE — 6360000002 HC RX W HCPCS

## 2024-07-13 PROCEDURE — 96374 THER/PROPH/DIAG INJ IV PUSH: CPT

## 2024-07-13 PROCEDURE — 74177 CT ABD & PELVIS W/CONTRAST: CPT

## 2024-07-13 PROCEDURE — 2580000003 HC RX 258

## 2024-07-13 PROCEDURE — 82803 BLOOD GASES ANY COMBINATION: CPT

## 2024-07-13 PROCEDURE — 80048 BASIC METABOLIC PNL TOTAL CA: CPT

## 2024-07-13 PROCEDURE — 83690 ASSAY OF LIPASE: CPT

## 2024-07-13 PROCEDURE — 87636 SARSCOV2 & INF A&B AMP PRB: CPT

## 2024-07-13 PROCEDURE — 6370000000 HC RX 637 (ALT 250 FOR IP)

## 2024-07-13 PROCEDURE — 2580000003 HC RX 258: Performed by: EMERGENCY MEDICINE

## 2024-07-13 PROCEDURE — 96375 TX/PRO/DX INJ NEW DRUG ADDON: CPT

## 2024-07-13 PROCEDURE — 6360000002 HC RX W HCPCS: Performed by: EMERGENCY MEDICINE

## 2024-07-13 PROCEDURE — 84703 CHORIONIC GONADOTROPIN ASSAY: CPT

## 2024-07-13 PROCEDURE — 6360000004 HC RX CONTRAST MEDICATION: Performed by: EMERGENCY MEDICINE

## 2024-07-13 PROCEDURE — 81003 URINALYSIS AUTO W/O SCOPE: CPT

## 2024-07-13 PROCEDURE — 85025 COMPLETE CBC W/AUTO DIFF WBC: CPT

## 2024-07-13 PROCEDURE — 1200000000 HC SEMI PRIVATE

## 2024-07-13 PROCEDURE — 80076 HEPATIC FUNCTION PANEL: CPT

## 2024-07-13 PROCEDURE — 99223 1ST HOSP IP/OBS HIGH 75: CPT | Performed by: SURGERY

## 2024-07-13 PROCEDURE — 99285 EMERGENCY DEPT VISIT HI MDM: CPT

## 2024-07-13 PROCEDURE — 83605 ASSAY OF LACTIC ACID: CPT

## 2024-07-13 PROCEDURE — 96361 HYDRATE IV INFUSION ADD-ON: CPT

## 2024-07-13 RX ORDER — HYDROMORPHONE HYDROCHLORIDE 1 MG/ML
0.5 INJECTION, SOLUTION INTRAMUSCULAR; INTRAVENOUS; SUBCUTANEOUS
Status: DISCONTINUED | OUTPATIENT
Start: 2024-07-13 | End: 2024-07-15

## 2024-07-13 RX ORDER — ONDANSETRON 4 MG/1
4 TABLET, ORALLY DISINTEGRATING ORAL EVERY 8 HOURS PRN
Status: DISCONTINUED | OUTPATIENT
Start: 2024-07-13 | End: 2024-07-16 | Stop reason: HOSPADM

## 2024-07-13 RX ORDER — BUTALBITAL, ACETAMINOPHEN AND CAFFEINE 50; 325; 40 MG/1; MG/1; MG/1
1 TABLET ORAL EVERY 4 HOURS PRN
Status: DISCONTINUED | OUTPATIENT
Start: 2024-07-13 | End: 2024-07-16 | Stop reason: HOSPADM

## 2024-07-13 RX ORDER — SODIUM CHLORIDE, SODIUM LACTATE, POTASSIUM CHLORIDE, CALCIUM CHLORIDE 600; 310; 30; 20 MG/100ML; MG/100ML; MG/100ML; MG/100ML
INJECTION, SOLUTION INTRAVENOUS CONTINUOUS
Status: DISCONTINUED | OUTPATIENT
Start: 2024-07-13 | End: 2024-07-15

## 2024-07-13 RX ORDER — HYDROMORPHONE HYDROCHLORIDE 1 MG/ML
1 INJECTION, SOLUTION INTRAMUSCULAR; INTRAVENOUS; SUBCUTANEOUS
Status: COMPLETED | OUTPATIENT
Start: 2024-07-13 | End: 2024-07-13

## 2024-07-13 RX ORDER — SODIUM CHLORIDE, SODIUM LACTATE, POTASSIUM CHLORIDE, AND CALCIUM CHLORIDE .6; .31; .03; .02 G/100ML; G/100ML; G/100ML; G/100ML
1000 INJECTION, SOLUTION INTRAVENOUS ONCE
Status: COMPLETED | OUTPATIENT
Start: 2024-07-13 | End: 2024-07-13

## 2024-07-13 RX ORDER — FERROUS SULFATE 325(65) MG
325 TABLET ORAL
COMMUNITY

## 2024-07-13 RX ORDER — ONDANSETRON 2 MG/ML
4 INJECTION INTRAMUSCULAR; INTRAVENOUS EVERY 6 HOURS PRN
Status: DISCONTINUED | OUTPATIENT
Start: 2024-07-13 | End: 2024-07-16 | Stop reason: HOSPADM

## 2024-07-13 RX ORDER — HYDROMORPHONE HYDROCHLORIDE 1 MG/ML
0.25 INJECTION, SOLUTION INTRAMUSCULAR; INTRAVENOUS; SUBCUTANEOUS
Status: DISCONTINUED | OUTPATIENT
Start: 2024-07-13 | End: 2024-07-15

## 2024-07-13 RX ORDER — METRONIDAZOLE 500 MG/100ML
500 INJECTION, SOLUTION INTRAVENOUS ONCE
Status: COMPLETED | OUTPATIENT
Start: 2024-07-13 | End: 2024-07-13

## 2024-07-13 RX ORDER — SODIUM CHLORIDE 9 MG/ML
INJECTION, SOLUTION INTRAVENOUS PRN
Status: DISCONTINUED | OUTPATIENT
Start: 2024-07-13 | End: 2024-07-16 | Stop reason: HOSPADM

## 2024-07-13 RX ORDER — ONDANSETRON 2 MG/ML
8 INJECTION INTRAMUSCULAR; INTRAVENOUS ONCE
Status: COMPLETED | OUTPATIENT
Start: 2024-07-13 | End: 2024-07-13

## 2024-07-13 RX ORDER — SODIUM CHLORIDE 0.9 % (FLUSH) 0.9 %
10 SYRINGE (ML) INJECTION EVERY 12 HOURS SCHEDULED
Status: DISCONTINUED | OUTPATIENT
Start: 2024-07-13 | End: 2024-07-16 | Stop reason: HOSPADM

## 2024-07-13 RX ORDER — METRONIDAZOLE 500 MG/100ML
500 INJECTION, SOLUTION INTRAVENOUS EVERY 8 HOURS
Status: DISCONTINUED | OUTPATIENT
Start: 2024-07-13 | End: 2024-07-16

## 2024-07-13 RX ORDER — ENOXAPARIN SODIUM 100 MG/ML
40 INJECTION SUBCUTANEOUS DAILY
Status: DISCONTINUED | OUTPATIENT
Start: 2024-07-13 | End: 2024-07-16 | Stop reason: HOSPADM

## 2024-07-13 RX ORDER — SODIUM CHLORIDE 0.9 % (FLUSH) 0.9 %
10 SYRINGE (ML) INJECTION PRN
Status: DISCONTINUED | OUTPATIENT
Start: 2024-07-13 | End: 2024-07-16 | Stop reason: HOSPADM

## 2024-07-13 RX ADMIN — SODIUM CHLORIDE, POTASSIUM CHLORIDE, SODIUM LACTATE AND CALCIUM CHLORIDE: 600; 310; 30; 20 INJECTION, SOLUTION INTRAVENOUS at 12:08

## 2024-07-13 RX ADMIN — HYDROMORPHONE HYDROCHLORIDE 1 MG: 1 INJECTION, SOLUTION INTRAMUSCULAR; INTRAVENOUS; SUBCUTANEOUS at 03:43

## 2024-07-13 RX ADMIN — METRONIDAZOLE 500 MG: 500 INJECTION, SOLUTION INTRAVENOUS at 15:08

## 2024-07-13 RX ADMIN — METRONIDAZOLE 500 MG: 500 INJECTION, SOLUTION INTRAVENOUS at 07:04

## 2024-07-13 RX ADMIN — BUTALBITAL, ACETAMINOPHEN, AND CAFFEINE 1 TABLET: 50; 325; 40 TABLET ORAL at 19:41

## 2024-07-13 RX ADMIN — CEFTRIAXONE SODIUM 2000 MG: 2 INJECTION, POWDER, FOR SOLUTION INTRAMUSCULAR; INTRAVENOUS at 06:56

## 2024-07-13 RX ADMIN — ONDANSETRON 8 MG: 2 INJECTION INTRAMUSCULAR; INTRAVENOUS at 03:46

## 2024-07-13 RX ADMIN — ENOXAPARIN SODIUM 40 MG: 100 INJECTION SUBCUTANEOUS at 12:00

## 2024-07-13 RX ADMIN — ONDANSETRON 4 MG: 2 INJECTION INTRAMUSCULAR; INTRAVENOUS at 19:41

## 2024-07-13 RX ADMIN — HYDROMORPHONE HYDROCHLORIDE 0.5 MG: 1 INJECTION, SOLUTION INTRAMUSCULAR; INTRAVENOUS; SUBCUTANEOUS at 19:49

## 2024-07-13 RX ADMIN — ONDANSETRON 4 MG: 2 INJECTION INTRAMUSCULAR; INTRAVENOUS at 12:04

## 2024-07-13 RX ADMIN — HYDROMORPHONE HYDROCHLORIDE 0.5 MG: 1 INJECTION, SOLUTION INTRAMUSCULAR; INTRAVENOUS; SUBCUTANEOUS at 12:01

## 2024-07-13 RX ADMIN — SODIUM CHLORIDE, PRESERVATIVE FREE 10 ML: 5 INJECTION INTRAVENOUS at 12:00

## 2024-07-13 RX ADMIN — HYDROMORPHONE HYDROCHLORIDE 0.5 MG: 1 INJECTION, SOLUTION INTRAMUSCULAR; INTRAVENOUS; SUBCUTANEOUS at 15:07

## 2024-07-13 RX ADMIN — IOPAMIDOL 75 ML: 755 INJECTION, SOLUTION INTRAVENOUS at 05:10

## 2024-07-13 RX ADMIN — METRONIDAZOLE 500 MG: 500 INJECTION, SOLUTION INTRAVENOUS at 23:04

## 2024-07-13 RX ADMIN — SODIUM CHLORIDE, PRESERVATIVE FREE 10 ML: 5 INJECTION INTRAVENOUS at 19:41

## 2024-07-13 RX ADMIN — SODIUM CHLORIDE, POTASSIUM CHLORIDE, SODIUM LACTATE AND CALCIUM CHLORIDE 1000 ML: 600; 310; 30; 20 INJECTION, SOLUTION INTRAVENOUS at 03:48

## 2024-07-13 RX ADMIN — HYDROMORPHONE HYDROCHLORIDE 0.5 MG: 1 INJECTION, SOLUTION INTRAMUSCULAR; INTRAVENOUS; SUBCUTANEOUS at 08:11

## 2024-07-13 ASSESSMENT — PAIN DESCRIPTION - ORIENTATION
ORIENTATION: LEFT
ORIENTATION: RIGHT;LEFT;LOWER;UPPER
ORIENTATION: MID
ORIENTATION: MID;ANTERIOR
ORIENTATION: RIGHT;LEFT;MID;LOWER;UPPER

## 2024-07-13 ASSESSMENT — PAIN DESCRIPTION - ONSET: ONSET: ON-GOING

## 2024-07-13 ASSESSMENT — PAIN DESCRIPTION - LOCATION
LOCATION: ABDOMEN

## 2024-07-13 ASSESSMENT — PAIN DESCRIPTION - PAIN TYPE: TYPE: ACUTE PAIN

## 2024-07-13 ASSESSMENT — PAIN SCALES - GENERAL
PAINLEVEL_OUTOF10: 8
PAINLEVEL_OUTOF10: 4
PAINLEVEL_OUTOF10: 7
PAINLEVEL_OUTOF10: 8
PAINLEVEL_OUTOF10: 7
PAINLEVEL_OUTOF10: 7
PAINLEVEL_OUTOF10: 10
PAINLEVEL_OUTOF10: 3

## 2024-07-13 ASSESSMENT — PAIN DESCRIPTION - DESCRIPTORS
DESCRIPTORS: ACHING
DESCRIPTORS: ACHING;CRAMPING
DESCRIPTORS: DISCOMFORT
DESCRIPTORS: ACHING
DESCRIPTORS: ACHING;SORE
DESCRIPTORS: SHARP
DESCRIPTORS: ACHING

## 2024-07-13 ASSESSMENT — LIFESTYLE VARIABLES
HOW MANY STANDARD DRINKS CONTAINING ALCOHOL DO YOU HAVE ON A TYPICAL DAY: 1 OR 2
HOW OFTEN DO YOU HAVE A DRINK CONTAINING ALCOHOL: 2-3 TIMES A WEEK

## 2024-07-13 ASSESSMENT — PAIN DESCRIPTION - FREQUENCY: FREQUENCY: CONTINUOUS

## 2024-07-13 ASSESSMENT — PAIN - FUNCTIONAL ASSESSMENT
PAIN_FUNCTIONAL_ASSESSMENT: 0-10
PAIN_FUNCTIONAL_ASSESSMENT: ACTIVITIES ARE NOT PREVENTED
PAIN_FUNCTIONAL_ASSESSMENT: PREVENTS OR INTERFERES SOME ACTIVE ACTIVITIES AND ADLS

## 2024-07-13 NOTE — ED PROVIDER NOTES
1.7 1.0 - 5.1 K/uL    Monocytes Absolute 0.9 0.0 - 1.3 K/uL    Eosinophils Absolute 0.3 0.0 - 0.6 K/uL    Basophils Absolute 0.0 0.0 - 0.2 K/uL   BMP w/ Reflex to MG   Result Value Ref Range    Sodium 135 (L) 136 - 145 mmol/L    Potassium reflex Magnesium 4.4 3.5 - 5.1 mmol/L    Chloride 101 99 - 110 mmol/L    CO2 23 21 - 32 mmol/L    Anion Gap 11 3 - 16    Glucose 112 (H) 70 - 99 mg/dL    BUN 8 7 - 20 mg/dL    Creatinine 0.7 0.6 - 1.1 mg/dL    Est, Glom Filt Rate >90 >60    Calcium 9.2 8.3 - 10.6 mg/dL   Hepatic Function Panel   Result Value Ref Range    Total Protein 7.3 6.4 - 8.2 g/dL    Albumin 3.7 3.4 - 5.0 g/dL    Alkaline Phosphatase 112 40 - 129 U/L    ALT 25 10 - 40 U/L    AST 40 (H) 15 - 37 U/L    Total Bilirubin 0.4 0.0 - 1.0 mg/dL    Bilirubin, Direct <0.2 0.0 - 0.3 mg/dL    Bilirubin, Indirect see below 0.0 - 1.0 mg/dL   Lipase   Result Value Ref Range    Lipase 20.0 13.0 - 60.0 U/L   Blood Gas, Venous   Result Value Ref Range    pH, Huy 7.416 7.350 - 7.450    pCO2, Huy 40.3 (L) 41.0 - 51.0 mmHg    PO2, Huy 64.1 (H) 25.0 - 40.0 mmHg    HCO3, Venous 25.9 24.0 - 28.0 mmol/L    Base Excess, Huy 1.2 -2.0 - 3.0 mmol/L    O2 Sat, Huy 93 Not established %    Carboxyhemoglobin 0.9 0.0 - 1.5 %    MetHgb, Huy 0.2 0.0 - 1.5 %    TC02 (Calc), Huy 27 mmol/L    Hemoglobin, Huy, Reduced 7.00 %   Urinalysis with Reflex to Culture    Specimen: Urine   Result Value Ref Range    Color, UA Yellow Straw/Yellow    Clarity, UA Clear Clear    Glucose, Ur Negative Negative mg/dL    Bilirubin, Urine Negative Negative    Ketones, Urine Negative Negative mg/dL    Specific Gravity, UA 1.025 1.005 - 1.030    Blood, Urine Negative Negative    pH, Urine 7.0 5.0 - 8.0    Protein, UA Negative Negative mg/dL    Urobilinogen, Urine 0.2 <2.0 E.U./dL    Nitrite, Urine Negative Negative    Leukocyte Esterase, Urine Negative Negative    Microscopic Examination Not Indicated     Urine Type NotGiven     Urine Reflex to Culture Not Indicated

## 2024-07-13 NOTE — H&P
use.    ROS: A 14 point review of systems was conducted, significant findings as noted in HPI. All other systems negative.    Physical exam:    Physical exam:    Vitals:    07/13/24 0430 07/13/24 0500 07/13/24 0530 07/13/24 0600   BP: 116/68 111/66 115/68 129/83   Pulse: 97  98 100   Resp: 16  17 18   Temp:       TempSrc:       SpO2: 94% 94% 94% 100%   Weight:       Height:           General appearance: alert, no acute distress, grooming appropriate  Eyes: PERRL, no scleral icterus  Neck: trachea midline, no JVD  Chest/Lungs: normal effort, no adventitious breathing, no accessory muscle use, on RA  Cardiovascular: RRR  Abdomen: soft, tender to palpation of LLQ and LUQ, non-distended, no rigidity, non-peritoneal  Skin: warm and dry, no rashes  Extremities: no edema, no cyanosis  Neuro: A&Ox3, no focal deficits, sensation intact    Labs:    CBC:   Recent Labs     07/13/24  0400   WBC 12.0*   HGB 11.7*   HCT 35.0*   MCV 87.7        BMP:   Recent Labs     07/13/24  0400   *   K 4.4      CO2 23   BUN 8   CREATININE 0.7     PT/INR: No results for input(s): \"PROTIME\", \"INR\" in the last 72 hours.  APTT: No results for input(s): \"APTT\" in the last 72 hours.  Liver Profile:   Lab Results   Component Value Date/Time    AST 40 07/13/2024 04:00 AM    ALT 25 07/13/2024 04:00 AM    BILIDIR <0.2 07/13/2024 04:00 AM    BILITOT 0.4 07/13/2024 04:00 AM    ALKPHOS 112 07/13/2024 04:00 AM    PROTEIN 7.3 07/13/2024 04:00 AM   No results found for: \"CHOL\", \"HDL\", \"TRIG\"  UA:   Lab Results   Component Value Date/Time    COLORU Yellow 07/13/2024 04:01 AM    PHUR 7.0 07/13/2024 04:01 AM    PHUR 7.0 08/27/2019 09:08 PM    WBCUA 0-3 10/28/2012 07:00 PM    RBCUA 0-3 10/28/2012 07:00 PM    BACTERIA Few 10/28/2012 07:00 PM    CLARITYU Clear 07/13/2024 04:01 AM    LEUKOCYTESUR Negative 07/13/2024 04:01 AM    UROBILINOGEN 0.2 07/13/2024 04:01 AM    BILIRUBINUR Negative 07/13/2024 04:01 AM    BLOODU Negative 07/13/2024 04:01 AM

## 2024-07-13 NOTE — ED NOTES
All other components within normal limits   BLOOD GAS, VENOUS - Abnormal; Notable for the following components:    pCO2, Huy 40.3 (*)     PO2, Huy 64.1 (*)     All other components within normal limits     Critical values: no     Abnormal Assessment Findings: generalized abdominal pain with vomiting    Background  History:   Past Medical History:   Diagnosis Date    Bacterial vaginosis 08/2019       Assessment    Vitals/MEWS: MEWS Score: 2  Level of Consciousness: Alert (0)   Vitals:    07/13/24 0430 07/13/24 0500 07/13/24 0530 07/13/24 0600   BP: 116/68 111/66 115/68 129/83   Pulse: 97  98 100   Resp: 16  17 18   Temp:       TempSrc:       SpO2: 94% 94% 94% 100%   Weight:       Height:         FiO2 (%):   O2 Flow Rate: O2 Device: None (Room air)    Cardiac Rhythm:    Pain Assessment: [x] Verbal [] Goddard Coles Scale  Pain Scale: Pain Assessment  Pain Assessment: 0-10  Pain Level: 3  Pain Location: Abdomen  Pain Orientation: Right, Left, Lower, Upper  Pain Descriptors: Aching  Last documented pain score (0-10 scale) Pain Level: 3  Last documented pain medication administered: see MAR  Mental Status: oriented, alert, coherent, logical, thought processes intact, and able to concentrate and follow conversation  Orientation Level:    NIH Score:    C-SSRS: Risk of Suicide: No Risk  Bedside swallow:    Creola Coma Scale (GCS): Audelia Coma Scale  Eye Opening: Spontaneous  Best Verbal Response: Oriented  Best Motor Response: Obeys commands  Creola Coma Scale Score: 15  Active LDA's:   Peripheral IV 07/13/24 Posterior;Right Hand (Active)                 PO Status: see chart  Pertinent or High Risk Medications/Drips: no   If Yes, please provide details:   Pending Blood Product Administration: no       You may also review the ED PT Care Timeline found under the Summary Nursing Index tab.    Recommendation    Pending orders all ED orders completed    Plan for Discharge (if known):   Additional Comments: patient is independent

## 2024-07-14 LAB
ALBUMIN SERPL-MCNC: 3.5 G/DL (ref 3.4–5)
ANION GAP SERPL CALCULATED.3IONS-SCNC: 12 MMOL/L (ref 3–16)
BASOPHILS # BLD: 0 K/UL (ref 0–0.2)
BASOPHILS NFR BLD: 0.2 %
BUN SERPL-MCNC: 5 MG/DL (ref 7–20)
CALCIUM SERPL-MCNC: 8.7 MG/DL (ref 8.3–10.6)
CHLORIDE SERPL-SCNC: 100 MMOL/L (ref 99–110)
CO2 SERPL-SCNC: 23 MMOL/L (ref 21–32)
CREAT SERPL-MCNC: 0.7 MG/DL (ref 0.6–1.1)
DEPRECATED RDW RBC AUTO: 14.3 % (ref 12.4–15.4)
EOSINOPHIL # BLD: 0.2 K/UL (ref 0–0.6)
EOSINOPHIL NFR BLD: 1.5 %
GFR SERPLBLD CREATININE-BSD FMLA CKD-EPI: >90 ML/MIN/{1.73_M2}
GLUCOSE SERPL-MCNC: 90 MG/DL (ref 70–99)
HCT VFR BLD AUTO: 33.9 % (ref 36–48)
HGB BLD-MCNC: 11.1 G/DL (ref 12–16)
LYMPHOCYTES # BLD: 1.5 K/UL (ref 1–5.1)
LYMPHOCYTES NFR BLD: 13.9 %
MAGNESIUM SERPL-MCNC: 1.9 MG/DL (ref 1.8–2.4)
MCH RBC QN AUTO: 29 PG (ref 26–34)
MCHC RBC AUTO-ENTMCNC: 32.8 G/DL (ref 31–36)
MCV RBC AUTO: 88.6 FL (ref 80–100)
MONOCYTES # BLD: 0.7 K/UL (ref 0–1.3)
MONOCYTES NFR BLD: 6.7 %
NEUTROPHILS # BLD: 8.1 K/UL (ref 1.7–7.7)
NEUTROPHILS NFR BLD: 77.7 %
PHOSPHATE SERPL-MCNC: 3.4 MG/DL (ref 2.5–4.9)
PLATELET # BLD AUTO: 226 K/UL (ref 135–450)
PMV BLD AUTO: 9.9 FL (ref 5–10.5)
POTASSIUM SERPL-SCNC: 3.8 MMOL/L (ref 3.5–5.1)
RBC # BLD AUTO: 3.82 M/UL (ref 4–5.2)
SODIUM SERPL-SCNC: 135 MMOL/L (ref 136–145)
WBC # BLD AUTO: 10.5 K/UL (ref 4–11)

## 2024-07-14 PROCEDURE — 85025 COMPLETE CBC W/AUTO DIFF WBC: CPT

## 2024-07-14 PROCEDURE — 6360000002 HC RX W HCPCS

## 2024-07-14 PROCEDURE — 36415 COLL VENOUS BLD VENIPUNCTURE: CPT

## 2024-07-14 PROCEDURE — 1200000000 HC SEMI PRIVATE

## 2024-07-14 PROCEDURE — 6370000000 HC RX 637 (ALT 250 FOR IP)

## 2024-07-14 PROCEDURE — 83735 ASSAY OF MAGNESIUM: CPT

## 2024-07-14 PROCEDURE — 99232 SBSQ HOSP IP/OBS MODERATE 35: CPT | Performed by: SURGERY

## 2024-07-14 PROCEDURE — 80069 RENAL FUNCTION PANEL: CPT

## 2024-07-14 PROCEDURE — 2580000003 HC RX 258

## 2024-07-14 RX ADMIN — BUTALBITAL, ACETAMINOPHEN, AND CAFFEINE 1 TABLET: 50; 325; 40 TABLET ORAL at 18:43

## 2024-07-14 RX ADMIN — HYDROMORPHONE HYDROCHLORIDE 0.5 MG: 1 INJECTION, SOLUTION INTRAMUSCULAR; INTRAVENOUS; SUBCUTANEOUS at 18:43

## 2024-07-14 RX ADMIN — HYDROMORPHONE HYDROCHLORIDE 0.5 MG: 1 INJECTION, SOLUTION INTRAMUSCULAR; INTRAVENOUS; SUBCUTANEOUS at 05:53

## 2024-07-14 RX ADMIN — METRONIDAZOLE 500 MG: 500 INJECTION, SOLUTION INTRAVENOUS at 06:52

## 2024-07-14 RX ADMIN — METRONIDAZOLE 500 MG: 500 INJECTION, SOLUTION INTRAVENOUS at 13:42

## 2024-07-14 RX ADMIN — BUTALBITAL, ACETAMINOPHEN, AND CAFFEINE 1 TABLET: 50; 325; 40 TABLET ORAL at 13:39

## 2024-07-14 RX ADMIN — ENOXAPARIN SODIUM 40 MG: 100 INJECTION SUBCUTANEOUS at 08:24

## 2024-07-14 RX ADMIN — ONDANSETRON 4 MG: 2 INJECTION INTRAMUSCULAR; INTRAVENOUS at 01:55

## 2024-07-14 RX ADMIN — ONDANSETRON 4 MG: 2 INJECTION INTRAMUSCULAR; INTRAVENOUS at 16:53

## 2024-07-14 RX ADMIN — HYDROMORPHONE HYDROCHLORIDE 0.5 MG: 1 INJECTION, SOLUTION INTRAMUSCULAR; INTRAVENOUS; SUBCUTANEOUS at 23:04

## 2024-07-14 RX ADMIN — METRONIDAZOLE 500 MG: 500 INJECTION, SOLUTION INTRAVENOUS at 23:01

## 2024-07-14 RX ADMIN — WATER 1000 MG: 1 INJECTION INTRAMUSCULAR; INTRAVENOUS; SUBCUTANEOUS at 08:23

## 2024-07-14 RX ADMIN — ONDANSETRON 4 MG: 2 INJECTION INTRAMUSCULAR; INTRAVENOUS at 23:04

## 2024-07-14 RX ADMIN — ONDANSETRON 4 MG: 2 INJECTION INTRAMUSCULAR; INTRAVENOUS at 09:01

## 2024-07-14 RX ADMIN — SODIUM CHLORIDE, POTASSIUM CHLORIDE, SODIUM LACTATE AND CALCIUM CHLORIDE: 600; 310; 30; 20 INJECTION, SOLUTION INTRAVENOUS at 04:44

## 2024-07-14 RX ADMIN — HYDROMORPHONE HYDROCHLORIDE 0.5 MG: 1 INJECTION, SOLUTION INTRAMUSCULAR; INTRAVENOUS; SUBCUTANEOUS at 09:01

## 2024-07-14 RX ADMIN — HYDROMORPHONE HYDROCHLORIDE 0.5 MG: 1 INJECTION, SOLUTION INTRAMUSCULAR; INTRAVENOUS; SUBCUTANEOUS at 13:38

## 2024-07-14 RX ADMIN — HYDROMORPHONE HYDROCHLORIDE 0.5 MG: 1 INJECTION, SOLUTION INTRAMUSCULAR; INTRAVENOUS; SUBCUTANEOUS at 01:55

## 2024-07-14 RX ADMIN — BUTALBITAL, ACETAMINOPHEN, AND CAFFEINE 1 TABLET: 50; 325; 40 TABLET ORAL at 05:56

## 2024-07-14 ASSESSMENT — PAIN SCALES - GENERAL
PAINLEVEL_OUTOF10: 5
PAINLEVEL_OUTOF10: 7
PAINLEVEL_OUTOF10: 4
PAINLEVEL_OUTOF10: 8
PAINLEVEL_OUTOF10: 7
PAINLEVEL_OUTOF10: 7
PAINLEVEL_OUTOF10: 5

## 2024-07-14 ASSESSMENT — PAIN DESCRIPTION - LOCATION
LOCATION: ABDOMEN

## 2024-07-14 ASSESSMENT — PAIN DESCRIPTION - ONSET
ONSET: ON-GOING

## 2024-07-14 ASSESSMENT — PAIN DESCRIPTION - DESCRIPTORS
DESCRIPTORS: ACHING

## 2024-07-14 ASSESSMENT — PAIN DESCRIPTION - ORIENTATION
ORIENTATION: MID
ORIENTATION: RIGHT
ORIENTATION: LOWER
ORIENTATION: MID

## 2024-07-14 ASSESSMENT — PAIN - FUNCTIONAL ASSESSMENT
PAIN_FUNCTIONAL_ASSESSMENT: ACTIVITIES ARE NOT PREVENTED

## 2024-07-14 ASSESSMENT — PAIN DESCRIPTION - FREQUENCY
FREQUENCY: CONTINUOUS

## 2024-07-14 ASSESSMENT — PAIN DESCRIPTION - PAIN TYPE
TYPE: ACUTE PAIN

## 2024-07-15 LAB
ALBUMIN SERPL-MCNC: 3.3 G/DL (ref 3.4–5)
ANION GAP SERPL CALCULATED.3IONS-SCNC: 12 MMOL/L (ref 3–16)
BASOPHILS # BLD: 0 K/UL (ref 0–0.2)
BASOPHILS NFR BLD: 0.5 %
BUN SERPL-MCNC: 5 MG/DL (ref 7–20)
CALCIUM SERPL-MCNC: 8.4 MG/DL (ref 8.3–10.6)
CHLORIDE SERPL-SCNC: 100 MMOL/L (ref 99–110)
CO2 SERPL-SCNC: 23 MMOL/L (ref 21–32)
CREAT SERPL-MCNC: 0.6 MG/DL (ref 0.6–1.1)
DEPRECATED RDW RBC AUTO: 14.1 % (ref 12.4–15.4)
EOSINOPHIL # BLD: 0.3 K/UL (ref 0–0.6)
EOSINOPHIL NFR BLD: 2.8 %
GFR SERPLBLD CREATININE-BSD FMLA CKD-EPI: >90 ML/MIN/{1.73_M2}
GLUCOSE SERPL-MCNC: 80 MG/DL (ref 70–99)
HCT VFR BLD AUTO: 32.8 % (ref 36–48)
HGB BLD-MCNC: 10.8 G/DL (ref 12–16)
LYMPHOCYTES # BLD: 1.2 K/UL (ref 1–5.1)
LYMPHOCYTES NFR BLD: 11.9 %
MAGNESIUM SERPL-MCNC: 1.9 MG/DL (ref 1.8–2.4)
MCH RBC QN AUTO: 29.1 PG (ref 26–34)
MCHC RBC AUTO-ENTMCNC: 32.9 G/DL (ref 31–36)
MCV RBC AUTO: 88.5 FL (ref 80–100)
MONOCYTES # BLD: 0.6 K/UL (ref 0–1.3)
MONOCYTES NFR BLD: 6.1 %
NEUTROPHILS # BLD: 7.8 K/UL (ref 1.7–7.7)
NEUTROPHILS NFR BLD: 78.7 %
PHOSPHATE SERPL-MCNC: 3.1 MG/DL (ref 2.5–4.9)
PLATELET # BLD AUTO: 249 K/UL (ref 135–450)
PMV BLD AUTO: 9.7 FL (ref 5–10.5)
POTASSIUM SERPL-SCNC: 4 MMOL/L (ref 3.5–5.1)
RBC # BLD AUTO: 3.71 M/UL (ref 4–5.2)
SODIUM SERPL-SCNC: 135 MMOL/L (ref 136–145)
WBC # BLD AUTO: 9.9 K/UL (ref 4–11)

## 2024-07-15 PROCEDURE — 36415 COLL VENOUS BLD VENIPUNCTURE: CPT

## 2024-07-15 PROCEDURE — 80069 RENAL FUNCTION PANEL: CPT

## 2024-07-15 PROCEDURE — 99232 SBSQ HOSP IP/OBS MODERATE 35: CPT | Performed by: SURGERY

## 2024-07-15 PROCEDURE — 6360000002 HC RX W HCPCS

## 2024-07-15 PROCEDURE — 85025 COMPLETE CBC W/AUTO DIFF WBC: CPT

## 2024-07-15 PROCEDURE — 6370000000 HC RX 637 (ALT 250 FOR IP)

## 2024-07-15 PROCEDURE — 2580000003 HC RX 258

## 2024-07-15 PROCEDURE — 83735 ASSAY OF MAGNESIUM: CPT

## 2024-07-15 PROCEDURE — 1200000000 HC SEMI PRIVATE

## 2024-07-15 RX ORDER — ACETAMINOPHEN 325 MG/1
650 TABLET ORAL EVERY 6 HOURS
Status: DISCONTINUED | OUTPATIENT
Start: 2024-07-15 | End: 2024-07-16 | Stop reason: HOSPADM

## 2024-07-15 RX ORDER — OXYCODONE HYDROCHLORIDE 5 MG/1
5 TABLET ORAL EVERY 4 HOURS PRN
Status: DISCONTINUED | OUTPATIENT
Start: 2024-07-15 | End: 2024-07-16 | Stop reason: HOSPADM

## 2024-07-15 RX ORDER — OXYCODONE HYDROCHLORIDE 5 MG/1
10 TABLET ORAL EVERY 4 HOURS PRN
Status: DISCONTINUED | OUTPATIENT
Start: 2024-07-15 | End: 2024-07-16 | Stop reason: HOSPADM

## 2024-07-15 RX ADMIN — METRONIDAZOLE 500 MG: 500 INJECTION, SOLUTION INTRAVENOUS at 06:41

## 2024-07-15 RX ADMIN — ENOXAPARIN SODIUM 40 MG: 100 INJECTION SUBCUTANEOUS at 07:58

## 2024-07-15 RX ADMIN — METRONIDAZOLE 500 MG: 500 INJECTION, SOLUTION INTRAVENOUS at 23:41

## 2024-07-15 RX ADMIN — ONDANSETRON 4 MG: 2 INJECTION INTRAMUSCULAR; INTRAVENOUS at 06:44

## 2024-07-15 RX ADMIN — WATER 1000 MG: 1 INJECTION INTRAMUSCULAR; INTRAVENOUS; SUBCUTANEOUS at 07:56

## 2024-07-15 RX ADMIN — ACETAMINOPHEN 650 MG: 325 TABLET ORAL at 20:17

## 2024-07-15 RX ADMIN — ONDANSETRON 4 MG: 2 INJECTION INTRAMUSCULAR; INTRAVENOUS at 20:17

## 2024-07-15 RX ADMIN — METRONIDAZOLE 500 MG: 500 INJECTION, SOLUTION INTRAVENOUS at 15:34

## 2024-07-15 RX ADMIN — ONDANSETRON 4 MG: 2 INJECTION INTRAMUSCULAR; INTRAVENOUS at 13:39

## 2024-07-15 RX ADMIN — HYDROMORPHONE HYDROCHLORIDE 0.5 MG: 1 INJECTION, SOLUTION INTRAMUSCULAR; INTRAVENOUS; SUBCUTANEOUS at 06:44

## 2024-07-15 RX ADMIN — HYDROMORPHONE HYDROCHLORIDE 0.5 MG: 1 INJECTION, SOLUTION INTRAMUSCULAR; INTRAVENOUS; SUBCUTANEOUS at 03:13

## 2024-07-15 RX ADMIN — ACETAMINOPHEN 650 MG: 325 TABLET ORAL at 13:39

## 2024-07-15 ASSESSMENT — PAIN DESCRIPTION - LOCATION
LOCATION: ABDOMEN
LOCATION: ABDOMEN

## 2024-07-15 ASSESSMENT — PAIN DESCRIPTION - DESCRIPTORS
DESCRIPTORS: ACHING
DESCRIPTORS: ACHING

## 2024-07-15 ASSESSMENT — PAIN SCALES - GENERAL
PAINLEVEL_OUTOF10: 7
PAINLEVEL_OUTOF10: 5
PAINLEVEL_OUTOF10: 4
PAINLEVEL_OUTOF10: 7

## 2024-07-15 ASSESSMENT — PAIN DESCRIPTION - ONSET
ONSET: ON-GOING
ONSET: ON-GOING

## 2024-07-15 ASSESSMENT — PAIN - FUNCTIONAL ASSESSMENT
PAIN_FUNCTIONAL_ASSESSMENT: ACTIVITIES ARE NOT PREVENTED
PAIN_FUNCTIONAL_ASSESSMENT: ACTIVITIES ARE NOT PREVENTED

## 2024-07-15 ASSESSMENT — PAIN DESCRIPTION - ORIENTATION
ORIENTATION: MID
ORIENTATION: MID

## 2024-07-15 ASSESSMENT — PAIN DESCRIPTION - FREQUENCY
FREQUENCY: CONTINUOUS
FREQUENCY: CONTINUOUS

## 2024-07-15 ASSESSMENT — PAIN DESCRIPTION - PAIN TYPE
TYPE: ACUTE PAIN
TYPE: ACUTE PAIN

## 2024-07-15 NOTE — DISCHARGE INSTRUCTIONS
Diet:  Low residue diet for two weeks then high residue.    Activity:   You have no activity/exercise restrictions.     Pain Control:  We are pleased that you experienced resolution of the pain with which you originally came over the course of your admission. Unless you have been informed of any restrictions by your primary care physician, please use the over-the-counter pain reliever of your choice for any small return of pain. If you have significant return of pain or new pain, please call or return to the Emergency Department.    Medications:  Resume your typical daily medications with the addition of antibiotics    Follow-up:  Please call your primary care physician to inform them about your recent hospitalization and possibly schedule a follow-up visit at their discretion.     Follow up with Dr. Nguyễn in 2 weeks. Please call [phone] to schedule your appointment.    Return Precautions:  Please call the office or call/return to the Emergency Department if you experience any significant increase in pain, fever over 101 degrees, or any new symptoms that concern you.

## 2024-07-16 VITALS
WEIGHT: 194.67 LBS | OXYGEN SATURATION: 96 % | HEIGHT: 65 IN | SYSTOLIC BLOOD PRESSURE: 139 MMHG | HEART RATE: 64 BPM | TEMPERATURE: 98.2 F | DIASTOLIC BLOOD PRESSURE: 79 MMHG | BODY MASS INDEX: 32.43 KG/M2 | RESPIRATION RATE: 16 BRPM

## 2024-07-16 LAB
ALBUMIN SERPL-MCNC: 3.2 G/DL (ref 3.4–5)
ANION GAP SERPL CALCULATED.3IONS-SCNC: 12 MMOL/L (ref 3–16)
BASOPHILS # BLD: 0 K/UL (ref 0–0.2)
BASOPHILS NFR BLD: 0.6 %
BUN SERPL-MCNC: 5 MG/DL (ref 7–20)
CALCIUM SERPL-MCNC: 8.7 MG/DL (ref 8.3–10.6)
CHLORIDE SERPL-SCNC: 102 MMOL/L (ref 99–110)
CO2 SERPL-SCNC: 23 MMOL/L (ref 21–32)
CREAT SERPL-MCNC: 0.6 MG/DL (ref 0.6–1.1)
DEPRECATED RDW RBC AUTO: 14.1 % (ref 12.4–15.4)
EOSINOPHIL # BLD: 0.4 K/UL (ref 0–0.6)
EOSINOPHIL NFR BLD: 5.6 %
GFR SERPLBLD CREATININE-BSD FMLA CKD-EPI: >90 ML/MIN/{1.73_M2}
GLUCOSE SERPL-MCNC: 86 MG/DL (ref 70–99)
HCT VFR BLD AUTO: 32.6 % (ref 36–48)
HGB BLD-MCNC: 11 G/DL (ref 12–16)
LYMPHOCYTES # BLD: 1.2 K/UL (ref 1–5.1)
LYMPHOCYTES NFR BLD: 17.8 %
MAGNESIUM SERPL-MCNC: 2 MG/DL (ref 1.8–2.4)
MCH RBC QN AUTO: 29.7 PG (ref 26–34)
MCHC RBC AUTO-ENTMCNC: 33.8 G/DL (ref 31–36)
MCV RBC AUTO: 88 FL (ref 80–100)
MONOCYTES # BLD: 0.5 K/UL (ref 0–1.3)
MONOCYTES NFR BLD: 8.1 %
NEUTROPHILS # BLD: 4.6 K/UL (ref 1.7–7.7)
NEUTROPHILS NFR BLD: 67.9 %
PHOSPHATE SERPL-MCNC: 3.1 MG/DL (ref 2.5–4.9)
PLATELET # BLD AUTO: 274 K/UL (ref 135–450)
PMV BLD AUTO: 9.2 FL (ref 5–10.5)
POTASSIUM SERPL-SCNC: 4.1 MMOL/L (ref 3.5–5.1)
RBC # BLD AUTO: 3.71 M/UL (ref 4–5.2)
SODIUM SERPL-SCNC: 137 MMOL/L (ref 136–145)
WBC # BLD AUTO: 6.8 K/UL (ref 4–11)

## 2024-07-16 PROCEDURE — 80069 RENAL FUNCTION PANEL: CPT

## 2024-07-16 PROCEDURE — 85025 COMPLETE CBC W/AUTO DIFF WBC: CPT

## 2024-07-16 PROCEDURE — 6370000000 HC RX 637 (ALT 250 FOR IP)

## 2024-07-16 PROCEDURE — 83735 ASSAY OF MAGNESIUM: CPT

## 2024-07-16 PROCEDURE — 99232 SBSQ HOSP IP/OBS MODERATE 35: CPT | Performed by: SURGERY

## 2024-07-16 PROCEDURE — 6360000002 HC RX W HCPCS

## 2024-07-16 PROCEDURE — 36415 COLL VENOUS BLD VENIPUNCTURE: CPT

## 2024-07-16 RX ORDER — AMOXICILLIN AND CLAVULANATE POTASSIUM 875; 125 MG/1; MG/1
1 TABLET, FILM COATED ORAL EVERY 12 HOURS SCHEDULED
Status: DISCONTINUED | OUTPATIENT
Start: 2024-07-16 | End: 2024-07-16 | Stop reason: HOSPADM

## 2024-07-16 RX ORDER — AMOXICILLIN AND CLAVULANATE POTASSIUM 875; 125 MG/1; MG/1
1 TABLET, FILM COATED ORAL EVERY 12 HOURS SCHEDULED
Qty: 14 TABLET | Refills: 0 | Status: SHIPPED | OUTPATIENT
Start: 2024-07-16 | End: 2024-07-23

## 2024-07-16 RX ORDER — ONDANSETRON 4 MG/1
4 TABLET, ORALLY DISINTEGRATING ORAL 3 TIMES DAILY PRN
Qty: 21 TABLET | Refills: 1 | Status: SHIPPED | OUTPATIENT
Start: 2024-07-16

## 2024-07-16 RX ADMIN — AMOXICILLIN AND CLAVULANATE POTASSIUM 1 TABLET: 875; 125 TABLET, FILM COATED ORAL at 08:07

## 2024-07-16 RX ADMIN — ACETAMINOPHEN 650 MG: 325 TABLET ORAL at 05:40

## 2024-07-16 RX ADMIN — ONDANSETRON 4 MG: 2 INJECTION INTRAMUSCULAR; INTRAVENOUS at 01:31

## 2024-07-16 RX ADMIN — METRONIDAZOLE 500 MG: 500 INJECTION, SOLUTION INTRAVENOUS at 05:41

## 2024-07-16 RX ADMIN — BUTALBITAL, ACETAMINOPHEN, AND CAFFEINE 1 TABLET: 50; 325; 40 TABLET ORAL at 05:45

## 2024-07-16 RX ADMIN — OXYCODONE 10 MG: 5 TABLET ORAL at 01:33

## 2024-07-16 ASSESSMENT — PAIN SCALES - GENERAL
PAINLEVEL_OUTOF10: 4
PAINLEVEL_OUTOF10: 7

## 2024-07-16 NOTE — DISCHARGE SUMMARY
Physician Discharge Summary     Patient ID:  Linda Marino  [id number]  44 y.o.  1980    Admit date: 7/13/2024    Discharge date and time: ***    Admitting Physician: Serg Nguyễn MD     Discharge Physician: same    Admission Diagnoses: Diverticulitis of colon [K57.32]  Diverticulitis of colon with perforation [K57.20]    Discharge Diagnoses: same    Admission Condition: fair    Discharged Condition: stable    Indication for Admission: ***    Hospital Course: ***      Disposition: {disposition:35198}    Patient Instructions:   [facility]  Activity: {discharge activity:65701}  Diet: {diet:75380}  Wound Care: {wound care:28695}    Follow-up with *** in {0-10:06587} {units:11}.    Signed:  MINI Reyes CNP  7/16/2024  10:30 AM

## 2024-07-16 NOTE — CARE COORDINATION
Case Management Assessment  Initial Evaluation    Date/Time of Evaluation: 7/15/2024 4:48 PM  Assessment Completed by: Itzel Torres RN    If patient is discharged prior to next notation, then this note serves as note for discharge by case management.    Patient Name: Linda Marino                   YOB: 1980  Diagnosis: Diverticulitis of colon [K57.32]  Diverticulitis of colon with perforation [K57.20]                   Date / Time: 7/13/2024  2:57 AM    Patient Admission Status: Inpatient   Readmission Risk (Low < 19, Mod (19-27), High > 27): Readmission Risk Score: 7.2    Current PCP: Larry James, DO  PCP verified by CM? Yes    Chart Reviewed: Yes      History Provided by: Patient  Patient Orientation: Alert and Oriented    Patient Cognition: Alert    Hospitalization in the last 30 days (Readmission):  No    If yes, Readmission Assessment in CM Navigator will be completed.    Advance Directives:      Code Status: Full Code   Patient's Primary Decision Maker is: Legal Next of Kin      Discharge Planning:    Patient lives with: Spouse/Significant Other Type of Home: House  Primary Care Giver: Self  Patient Support Systems include: Parent, Children   Current Financial resources: None  Current community resources: None  Current services prior to admission: None            Current DME:              Type of Home Care services:  None    ADLS  Prior functional level: Independent in ADLs/IADLs  Current functional level: Independent in ADLs/IADLs    PT AM-PAC:   /24  OT AM-PAC:   /24    Family can provide assistance at DC: Yes  Would you like Case Management to discuss the discharge plan with any other family members/significant others, and if so, who? No  Plans to Return to Present Housing: Yes  Other Identified Issues/Barriers to RETURNING to current housing: n/a  Potential Assistance needed at discharge: N/A            Potential DME:    Patient expects to discharge to: House  Plan for transportation at 
Disposition: Home- No Services Needed    LOC at discharge: Not Applicable  CANDY Completed: No    Notification completed in HENS/PAS?:  No    IMM Completed:   No         Transportation:  Transportation PLAN for discharge: family   Mode of Transport: Private Car  Reason for medical transport: Not Applicable  Name of Transport Company: Not Applicable  Time of Transport: TBD      Additional CM Notes: Patient cleared for d/c to home. No CM needs.     COVID Result:    Lab Results   Component Value Date/Time    COVID19 NOT DETECTED 07/13/2024 03:32 AM       The Plan for Transition of Care is related to the following treatment goals of Diverticulitis of colon [K57.32]  Diverticulitis of colon with perforation [K57.20]    The Patient and/or patient representative Linda and her family were provided with a choice of provider and agrees with the discharge plan Yes    Freedom of choice list was provided with basic dialogue that supports the patient's individualized plan of care/goals and shares the quality data associated with the providers. Yes    Care Transitions patient: No    Itzel Torres RN  [facility]  Case Management Department  Ph: [phone]  Fax: [phone]

## 2024-07-16 NOTE — DISCHARGE SUMMARY
Discharge Summary      Patient:    Linda Marino    Admit Date:   7/13/2024  2:57 AM    Discharge Date:   7/13/2924 Morning    Admitting Physician:   Serg Nguyễn MD     Discharge Physician:   same    Admitting Diagnosis:  Diverticulitis of colon     Discharge Diagnosis:   same     Past Medical History:   Diagnosis Date    Bacterial vaginosis 08/2019    Diverticulitis         Indication for Admission:   Sigmoid diverticulitis with small early perforation    Hospital Course:   Patient [facility] to [facility] with c/o abdominal pain. Initial work-up was remarkable for WBC 12. CT abd pelvis showed sigmoid diverticulitis with small early perforation, Hinchey class 1B diverticulitis. Patient was treated conservatively with antibiotics (Rocephin and Flagyl). The patient's hospital course was uncomplicated. He tolerated regular diet. Pain was well controlled. Leukocytosis improved to 6.8. Patient is deemed stable for discharge home. He will be discharged home with oral antibiotics and follow-up in clinic.     Discharge physical exam:  General appearance: alert, no acute distress, grooming appropriate  Eyes: PERRL, no scleral icterus  Neck: trachea midline, no JVD  Chest/Lungs: normal effort, no adventitious breathing, no accessory muscle use, on RA  Cardiovascular: RRR  Abdomen: soft, mildly tender to palpation of LLQ , non-distended, no rigidity, non-peritoneal  Skin: warm and dry, no rashes  Extremities: no edema, no cyanosis  Neuro: A&Ox3, no focal deficits, sensation intact    Disposition:    Home    Condition at discharge:  Stable    Discharge Instructions:  See separate form    Patient Instructions:      Medication List        ASK your doctor about these medications      apixaban 5 MG Tabs tablet  Commonly known as: ELIQUIS     ferrous sulfate 325 (65 Fe) MG tablet  Commonly known as: IRON 325     Jeri 0.25-35 MG-MCG per tablet  Generic drug: norgestimate-ethinyl estradiol              Darby Castillo DO  PGY1,

## 2024-07-16 NOTE — PROGRESS NOTES
4 Eyes Skin Assessment     NAME:  Linda Marino  YOB: 1980  MEDICAL RECORD NUMBER:  [id number]    The patient is being assessed for  Admission    I agree that at least one RN has performed a thorough Head to Toe Skin Assessment on the patient. ALL assessment sites listed below have been assessed.      Areas assessed by both nurses:    Head, Face, Ears, Shoulders, Back, Chest, Arms, Elbows, Hands, Sacrum. Buttock, Coccyx, Ischium, and Legs. Feet and Heels        Does the Patient have a Wound? No noted wound(s)       Franco Prevention initiated by RN: No  Wound Care Orders initiated by RN: No    Pressure Injury (Stage 3,4, Unstageable, DTI, NWPT, and Complex wounds) if present, place Wound referral order by RN under : No    New Ostomies, if present place, Ostomy referral order under : No     Nurse 1 eSignature: Electronically signed by Armaan Chapa RN on 7/13/24 at 6:16 PM EDT    **SHARE this note so that the co-signing nurse can place an eSignature**    Nurse 2 eSignature: {Esignature:[id number]}   
Brief Progress Note    Serial Abdominal Exam:     Performed a serial abdominal exam on patient at 2:16AM. Patient has no nausea, vomiting. Pain is improved with PRN dilaudid.      Abd: soft, non-distended, epigastric area is tender to palpation. Palpation to LUQ and LLQ demonstrated less tenderness than previous examination. No guarding, rebound or rigidity    - continue management    Felecia Dobbs MD  PGY1, General Surgery  07/14/24  2:21 AM  [phone]   
Brief Progress Note    Serial abdominal exam:    Patient with minimal nausea, no vomiting. Pain feels slightly improved or about the same. Passing flatus, no bowel movements.     O:   Vitals:    07/13/24 1458   BP: 132/87   Pulse: (!) 101   Resp: 16   Temp: 98.8 °F (37.1 °C)   SpO2: 96%     Abd: soft, stable TTP LUQ and LLQ, non-distended, no guarding, rebound, or rigidity    -continue management    Zonia Olea MD  PGY2, General Surgery  07/13/24   5:05 PM   [employer]  [phone]    
General Surgery   Daily Progress Note  Patient: Linda Marino      CC: Perforated diverticulitis    SUBJECTIVE:   NAEON. Improved abd pain. Continues to report abd tenderness with ambulation. No nausea or vomiting. Passing flatus. Does not feel hungry.     ROS:   A 14 point review of systems was conducted, significant findings as noted above. All other systems negative.    OBJECTIVE:    PHYSICAL EXAM:    Vitals:    07/13/24 1943 07/13/24 2305 07/14/24 0416 07/14/24 0643   BP: 136/83 104/67 133/86    Pulse: (!) 104 92 89    Resp: 16 16 16    Temp: 98.4 °F (36.9 °C) 97.9 °F (36.6 °C) 98.3 °F (36.8 °C)    TempSrc: Oral Oral Oral    SpO2: 96% 96% 96%    Weight:    88.3 kg (194 lb 10.7 oz)   Height:           General appearance: alert, no acute distress, grooming appropriate  Eyes: PERRL, no scleral icterus  Neck: trachea midline, no JVD  Chest/Lungs: normal effort, no adventitious breathing, no accessory muscle use, on RA  Cardiovascular: RRR  Abdomen: soft, tender to palpation of LLQ and LUQ, non-distended, no rigidity, non-peritoneal, + Rosving sign on RUQ  Skin: warm and dry, no rashes  Extremities: no edema, no cyanosis  Neuro: A&Ox3, no focal deficits, sensation intact    LABS:   Recent Labs     07/13/24  0400 07/14/24  0539   WBC 12.0* 10.5   HGB 11.7* 11.1*   HCT 35.0* 33.9*   MCV 87.7 88.6    226        Recent Labs     07/13/24  0400   *   K 4.4      CO2 23   BUN 8   CREATININE 0.7        Recent Labs     07/13/24  0400   AST 40*   ALT 25   BILIDIR <0.2   BILITOT 0.4   ALKPHOS 112        Recent Labs     07/13/24  0400   LIPASE 20.0      No results for input(s): \"INR\", \"APTT\" in the last 72 hours.    Invalid input(s): \"PROT\"   No results for input(s): \"CKTOTAL\", \"CKMB\", \"CKMBINDEX\", \"TROPONINI\" in the last 72 hours.      ASSESSMENT & PLAN:   This is a 44 y.o. female with Hx of DVT on Eliquis, surgical history of 3x C-sections, and recent cervical ablation a month ago who presents for one day 
General Surgery   Daily Progress Note  Patient: Linda Marino      CC: Perforated diverticulitis    SUBJECTIVE:   No acute events overnight. Tolerated some clears but had a little nausea. Pain is slowly improving.     ROS:   A 14 point review of systems was conducted, significant findings as noted above. All other systems negative.    OBJECTIVE:    PHYSICAL EXAM:    Vitals:    07/14/24 1436 07/14/24 1946 07/14/24 2259 07/15/24 0317   BP: 121/75 135/87 138/85 120/78   Pulse: 100 96 90 94   Resp: 18 18 16 16   Temp: 98.5 °F (36.9 °C) 98.7 °F (37.1 °C) 98.5 °F (36.9 °C) 98.3 °F (36.8 °C)   TempSrc: Oral Oral Oral Oral   SpO2: 95% 93% 95% 93%   Weight:       Height:           General appearance: alert, no acute distress, grooming appropriate  Eyes: PERRL, no scleral icterus  Neck: trachea midline, no JVD  Chest/Lungs: normal effort, no adventitious breathing, no accessory muscle use, on RA  Cardiovascular: RRR  Abdomen: soft, mildly tender to palpation of LLQ , non-distended, no rigidity, non-peritoneal  Skin: warm and dry, no rashes  Extremities: no edema, no cyanosis  Neuro: A&Ox3, no focal deficits, sensation intact    LABS:   Recent Labs     07/13/24  0400 07/14/24  0539   WBC 12.0* 10.5   HGB 11.7* 11.1*   HCT 35.0* 33.9*   MCV 87.7 88.6    226          Recent Labs     07/13/24  0400 07/14/24  0539   * 135*   K 4.4 3.8    100   CO2 23 23   PHOS  --  3.4   BUN 8 5*   CREATININE 0.7 0.7          Recent Labs     07/13/24  0400   AST 40*   ALT 25   BILIDIR <0.2   BILITOT 0.4   ALKPHOS 112          Recent Labs     07/13/24  0400   LIPASE 20.0        No results for input(s): \"INR\", \"APTT\" in the last 72 hours.    Invalid input(s): \"PROT\"   No results for input(s): \"CKTOTAL\", \"CKMB\", \"CKMBINDEX\", \"TROPONINI\" in the last 72 hours.      ASSESSMENT & PLAN:   This is a 44 y.o. female with Hx of DVT on Eliquis, surgical history of 3x C-sections, and recent cervical ablation a month ago who presents for one 
General Surgery  Interval Note:    Patient seen at bedside as part of our previously-stated plan for serial abdominal exams.  She reports slight improvement in her subjective abdominal pain, being able to move around in bed more easily this evening when compared to this morning.  Tolerating some clear liquids with minimal nausea.  Is not hungry for further diet choices.  Currently, the patient's abdomen is moderately tender to palpation throughout with voluntary guarding within the LLQ, which is stable compared to our earlier exam.  We will continue regular assessments to evaluate for changes.    Kalia Ortiz IV, MD  General Surgery, PGY-3  07/14/24  10:21 PM  [phone]    
Patient admitted to room 5313 from ED. Patient is A&O x 4. VSS. Patient oriented to the room all safety measures in place. Admission orders released and patient 4 eyes completed. Admission documentation completed. No other needs are noted at this time.    [x] Bed cord plugged into wall  [x] Bed in lowest position  [x] Call light and bedside table within reach  [x] Patient educated on all safety measures  []Oxygen connected to wall (if applicable)     Nurse 1 Esignature: Electronically signed by Armaan Chapa RN on 7/13/24 at 6:17 PM EDT  Nurse 2 Esignature: {Esignature:[id number]}   
Pt alert and oriented. VSS. Pt reporting abdominal pain that is being controlled with PRN Dilaudid, see MAR. Pt NPO per orders. Pt has IVF and intermittent ABX infusing per orders. Pt has call light within reach, bed in lowest position with wheels locked, 2/4 side rails up, and pt is up ad zac.  
Pt alert and oriented. VSS. Pt reporting abdominal pain that is being controlled with PRN Dilaudid, see MAR. Pt also reporting nausea, PRN Zofran given. Pt voiding freely. Pt has IVF and intermittent ABX infusing per orders. Pt has call light within reach, bed in lowest position with wheels locked, 2/4 side rails up, and pt is up ad zac.  
VSS, pain medication was given to manage pain, pt had x1 emesis and relieved with prn med. Pt ambulated the hallway. Call light used for needs.   
  This is a 44 y.o. female with Hx of DVT on Eliquis, surgical history of 3x C-sections, and recent cervical ablation a month ago who presents for one day of worsening abdominal pain. CT A/P showed sigmoid diverticulitis with small early perforation with small gas bubbles adjacent to the sigmoid colon. No abscess formation or acute inflammation elsewhere identified. Hinchey class 1B diverticulitis     - Continue regular diet  - Will transition to oral abx with Augmentin for total 7 day course from admission.  - Hold home Eliquis, DVT ppx with lovenox. Can resume Eliquis on discharge  - Patient will need colonoscopy when this flare is resolved  - Disposition: later today    Darby Castillo DO  PGY1, General Surgery  07/16/24  6:15 AM  [phone]     I have seen, examined, and reviewed the patients chart. I agree with the residents assessment and have made appropriate changes.    Serg Nguyễn

## 2024-08-05 ENCOUNTER — OFFICE VISIT (OUTPATIENT)
Dept: SURGERY | Age: 44
End: 2024-08-05
Payer: COMMERCIAL

## 2024-08-05 VITALS
DIASTOLIC BLOOD PRESSURE: 84 MMHG | WEIGHT: 189 LBS | BODY MASS INDEX: 31.45 KG/M2 | HEART RATE: 77 BPM | SYSTOLIC BLOOD PRESSURE: 125 MMHG

## 2024-08-05 DIAGNOSIS — K57.32 DIVERTICULITIS OF COLON: Primary | ICD-10-CM

## 2024-08-05 PROCEDURE — 3079F DIAST BP 80-89 MM HG: CPT | Performed by: SURGERY

## 2024-08-05 PROCEDURE — 3074F SYST BP LT 130 MM HG: CPT | Performed by: SURGERY

## 2024-08-05 PROCEDURE — 99214 OFFICE O/P EST MOD 30 MIN: CPT | Performed by: SURGERY

## 2024-08-05 NOTE — PROGRESS NOTES
PATIENT NAME: Linda Marino     YOB: 1980     TODAY'S DATE: 2024    Reason for Visit:  ED follow up - diverticulitis    HISTORY OF PRESENT ILLNESS:              The patient is a 44 y.o. female with a PMHx as delineated below who presents after admission for diverticulitis. Discharged . Doing well since discharge. Reports that she finished her antibiotics. Tolerating food. She did state that she had some LLQ abdominal pain after eating a spicy meal several days ago. Reports that since then, she has been on a blander diet, and has had no recurrence of her pain. Has been having regular bowel movements. Denies any nausea or vomiting. No difficulty urinating. No other medical complaints or injuries verbalized at this time.     Chief Complaint   Patient presents with    New Patient     Hospital Follow up- Diverticulitis             REVIEW OF SYSTEMS:  CONSTITUTIONAL:  negative  HEENT:  negative  RESPIRATORY:  negative  CARDIOVASCULAR:  negative  GASTROINTESTINAL:  negative   GENITOURINARY:  negative  HEMATOLOGIC/LYMPHATIC:  negative  MUSCULOSKELETAL: negative  NEUROLOGICAL:  negative    PMH  Past Medical History:   Diagnosis Date    Bacterial vaginosis 2019    Diverticulitis        [facility]  Past Surgical History:   Procedure Laterality Date     SECTION      x3    TUBAL LIGATION         Social History  Social History     Socioeconomic History    Marital status: Single     Spouse name: Not on file    Number of children: Not on file    Years of education: Not on file    Highest education level: Not on file   Occupational History    Not on file   Tobacco Use    Smoking status: Never    Smokeless tobacco: Never   Substance and Sexual Activity    Alcohol use: Yes     Comment: 1-2x/week    Drug use: No    Sexual activity: Not Currently     Partners: Male   Other Topics Concern    Not on file   Social History Narrative    Not on file     Social Determinants of Health     Financial Resource Strain:

## 2025-04-13 ENCOUNTER — APPOINTMENT (OUTPATIENT)
Dept: CT IMAGING | Age: 45
DRG: 103 | End: 2025-04-13
Payer: COMMERCIAL

## 2025-04-13 ENCOUNTER — HOSPITAL ENCOUNTER (INPATIENT)
Age: 45
LOS: 1 days | Discharge: HOME | DRG: 103 | End: 2025-04-14
Attending: STUDENT IN AN ORGANIZED HEALTH CARE EDUCATION/TRAINING PROGRAM | Admitting: INTERNAL MEDICINE
Payer: COMMERCIAL

## 2025-04-13 DIAGNOSIS — H53.9 VISION CHANGES: ICD-10-CM

## 2025-04-13 DIAGNOSIS — R20.9 ALTERATIONS OF SENSATIONS: Primary | ICD-10-CM

## 2025-04-13 LAB
ANION GAP SERPL CALCULATED.3IONS-SCNC: 11 MMOL/L (ref 3–16)
BASOPHILS # BLD: 0.1 K/UL (ref 0–0.2)
BASOPHILS NFR BLD: 0.9 %
BUN SERPL-MCNC: 12 MG/DL (ref 7–20)
CALCIUM SERPL-MCNC: 9.8 MG/DL (ref 8.3–10.6)
CHLORIDE SERPL-SCNC: 102 MMOL/L (ref 99–110)
CO2 SERPL-SCNC: 25 MMOL/L (ref 21–32)
CREAT SERPL-MCNC: 0.8 MG/DL (ref 0.6–1.1)
DEPRECATED RDW RBC AUTO: 13.7 % (ref 12.4–15.4)
EOSINOPHIL # BLD: 0.2 K/UL (ref 0–0.6)
EOSINOPHIL NFR BLD: 2.6 %
GFR SERPLBLD CREATININE-BSD FMLA CKD-EPI: >90 ML/MIN/{1.73_M2}
GLUCOSE BLD-MCNC: 108 MG/DL (ref 70–99)
GLUCOSE SERPL-MCNC: 102 MG/DL (ref 70–99)
HCG SERPL QL: NEGATIVE
HCT VFR BLD AUTO: 36.4 % (ref 36–48)
HGB BLD-MCNC: 12.3 G/DL (ref 12–16)
LYMPHOCYTES # BLD: 2.6 K/UL (ref 1–5.1)
LYMPHOCYTES NFR BLD: 30.2 %
MCH RBC QN AUTO: 29.7 PG (ref 26–34)
MCHC RBC AUTO-ENTMCNC: 33.9 G/DL (ref 31–36)
MCV RBC AUTO: 87.7 FL (ref 80–100)
MONOCYTES # BLD: 0.6 K/UL (ref 0–1.3)
MONOCYTES NFR BLD: 6.9 %
NEUTROPHILS # BLD: 5 K/UL (ref 1.7–7.7)
NEUTROPHILS NFR BLD: 59.4 %
PERFORMED ON: ABNORMAL
PLATELET # BLD AUTO: 276 K/UL (ref 135–450)
PMV BLD AUTO: 10.2 FL (ref 5–10.5)
POTASSIUM SERPL-SCNC: 4.2 MMOL/L (ref 3.5–5.1)
RBC # BLD AUTO: 4.15 M/UL (ref 4–5.2)
SODIUM SERPL-SCNC: 138 MMOL/L (ref 136–145)
WBC # BLD AUTO: 8.4 K/UL (ref 4–11)

## 2025-04-13 PROCEDURE — 85025 COMPLETE CBC W/AUTO DIFF WBC: CPT

## 2025-04-13 PROCEDURE — 2060000000 HC ICU INTERMEDIATE R&B

## 2025-04-13 PROCEDURE — 70496 CT ANGIOGRAPHY HEAD: CPT

## 2025-04-13 PROCEDURE — 84703 CHORIONIC GONADOTROPIN ASSAY: CPT

## 2025-04-13 PROCEDURE — G0378 HOSPITAL OBSERVATION PER HR: HCPCS

## 2025-04-13 PROCEDURE — 6370000000 HC RX 637 (ALT 250 FOR IP)

## 2025-04-13 PROCEDURE — 99285 EMERGENCY DEPT VISIT HI MDM: CPT

## 2025-04-13 PROCEDURE — 80048 BASIC METABOLIC PNL TOTAL CA: CPT

## 2025-04-13 PROCEDURE — 6360000004 HC RX CONTRAST MEDICATION: Performed by: PHYSICIAN ASSISTANT

## 2025-04-13 RX ORDER — ONDANSETRON 2 MG/ML
4 INJECTION INTRAMUSCULAR; INTRAVENOUS EVERY 6 HOURS PRN
Status: DISCONTINUED | OUTPATIENT
Start: 2025-04-13 | End: 2025-04-14 | Stop reason: HOSPADM

## 2025-04-13 RX ORDER — ATORVASTATIN CALCIUM 80 MG/1
80 TABLET, FILM COATED ORAL NIGHTLY
Status: DISCONTINUED | OUTPATIENT
Start: 2025-04-13 | End: 2025-04-14 | Stop reason: HOSPADM

## 2025-04-13 RX ORDER — POLYETHYLENE GLYCOL 3350 17 G/17G
17 POWDER, FOR SOLUTION ORAL DAILY PRN
Status: DISCONTINUED | OUTPATIENT
Start: 2025-04-13 | End: 2025-04-14 | Stop reason: HOSPADM

## 2025-04-13 RX ORDER — ONDANSETRON 4 MG/1
4 TABLET, ORALLY DISINTEGRATING ORAL EVERY 8 HOURS PRN
Status: DISCONTINUED | OUTPATIENT
Start: 2025-04-13 | End: 2025-04-14 | Stop reason: HOSPADM

## 2025-04-13 RX ORDER — ACETAMINOPHEN 325 MG/1
650 TABLET ORAL EVERY 6 HOURS PRN
Status: DISCONTINUED | OUTPATIENT
Start: 2025-04-13 | End: 2025-04-14 | Stop reason: HOSPADM

## 2025-04-13 RX ORDER — ACETAMINOPHEN 650 MG/1
650 SUPPOSITORY RECTAL EVERY 6 HOURS PRN
Status: DISCONTINUED | OUTPATIENT
Start: 2025-04-13 | End: 2025-04-14 | Stop reason: HOSPADM

## 2025-04-13 RX ORDER — SODIUM CHLORIDE 0.9 % (FLUSH) 0.9 %
5-40 SYRINGE (ML) INJECTION PRN
Status: DISCONTINUED | OUTPATIENT
Start: 2025-04-13 | End: 2025-04-14 | Stop reason: HOSPADM

## 2025-04-13 RX ORDER — IOPAMIDOL 755 MG/ML
75 INJECTION, SOLUTION INTRAVASCULAR
Status: COMPLETED | OUTPATIENT
Start: 2025-04-13 | End: 2025-04-13

## 2025-04-13 RX ORDER — SODIUM CHLORIDE 0.9 % (FLUSH) 0.9 %
5-40 SYRINGE (ML) INJECTION EVERY 12 HOURS SCHEDULED
Status: DISCONTINUED | OUTPATIENT
Start: 2025-04-13 | End: 2025-04-14 | Stop reason: HOSPADM

## 2025-04-13 RX ORDER — MAGNESIUM SULFATE IN WATER 40 MG/ML
2000 INJECTION, SOLUTION INTRAVENOUS PRN
Status: DISCONTINUED | OUTPATIENT
Start: 2025-04-13 | End: 2025-04-14 | Stop reason: HOSPADM

## 2025-04-13 RX ORDER — POTASSIUM CHLORIDE 1500 MG/1
40 TABLET, EXTENDED RELEASE ORAL PRN
Status: DISCONTINUED | OUTPATIENT
Start: 2025-04-13 | End: 2025-04-14 | Stop reason: HOSPADM

## 2025-04-13 RX ORDER — SODIUM CHLORIDE 9 MG/ML
INJECTION, SOLUTION INTRAVENOUS PRN
Status: DISCONTINUED | OUTPATIENT
Start: 2025-04-13 | End: 2025-04-14 | Stop reason: HOSPADM

## 2025-04-13 RX ORDER — POTASSIUM CHLORIDE 7.45 MG/ML
10 INJECTION INTRAVENOUS PRN
Status: DISCONTINUED | OUTPATIENT
Start: 2025-04-13 | End: 2025-04-14 | Stop reason: HOSPADM

## 2025-04-13 RX ADMIN — IOPAMIDOL 75 ML: 755 INJECTION, SOLUTION INTRAVENOUS at 19:53

## 2025-04-13 RX ADMIN — ATORVASTATIN CALCIUM 80 MG: 80 TABLET, FILM COATED ORAL at 23:25

## 2025-04-13 ASSESSMENT — PAIN - FUNCTIONAL ASSESSMENT: PAIN_FUNCTIONAL_ASSESSMENT: NONE - DENIES PAIN

## 2025-04-13 ASSESSMENT — LIFESTYLE VARIABLES
HOW OFTEN DO YOU HAVE A DRINK CONTAINING ALCOHOL: 2-3 TIMES A WEEK
HOW MANY STANDARD DRINKS CONTAINING ALCOHOL DO YOU HAVE ON A TYPICAL DAY: 1 OR 2

## 2025-04-13 NOTE — ED PROVIDER NOTES
ED Attending Attestation Note     Date of evaluation: 4/13/2025    This patient was seen by the advanced practice provider.  I have seen and examined the patient, agree with the workup, evaluation, management and diagnosis. The care plan has been discussed.    My assessment reveals a woman with 4 days of vague right sided paresthesias/decreased sensation (both arm and leg) as well as vague right eye vision changes (no pain). No fevers or true neck pain or pain with movement of neck. Question of posterior occiupt headache, but it is not severe. No n/v.    On exam    Vitals:    04/13/25 1707 04/13/25 1711   BP:  (!) 141/101   Pulse:  79   Resp:  18   Temp:  98 °F (36.7 °C)   TempSrc:  Oral   SpO2:  98%   Weight: 90.1 kg (198 lb 9.6 oz)        General:  Well appearing. No acute distress.  Non-toxic appearing     Eyes:  Pupils equally round, equally round. No RAPD. No discharge from eyes. No papilledema b/l.  ENT:  No discharge from nose. OP clear.   Neck:  Supple. Trachea midline.    Pulmonary:   Non-labored breathing.   Breath sounds clear bilaterally.   Symmetric chest wall excursion   Cardiac:  Regular rhythm. Normal  rate. No murmurs.    Abdomen:  Soft. Non-tender. Non-distended. No masses.     Musculoskeletal:  No long bone deformity.  No ankle or wrist deformity.  Vascular:  Extremities warm and perfused.  Radial pulses 2+ bilaterally.  PT pulses 2+ bilaterally.   Skin:  No rash. Warm.    Neuro:  Mental Status: GCS 15. AAOx4. Speech is clear and fluent, with no aphasia or dysarthria.  CN: Visual fields are full to confrontation. Pupils are 4->2 mm bilaterally, round and briskly reactive to light. EOMI with no nystagmus. Facial sensation is intact in all 3 divisions bilaterally. Face is symmetric with normal eye closure and smile. Hearing is grossly intact bilaterally. Palate elevates symmetrically. Phonation is normal. Shoulder shrug is intact bilaterally. Tongue is midline with normal movement and no

## 2025-04-13 NOTE — ED PROVIDER NOTES
THE [facility]  EMERGENCY DEPARTMENT ENCOUNTER          PHYSICIAN ASSISTANT NOTE       Date of evaluation: 4/13/2025    Chief Complaint     Numbness (Pt reports numbness x4 days on the right side of her body. ) and Eye Problem (Right sided vision blurry for 4 days days ago.)      History of Present Illness     Linda Marino is a 44 y.o. female who presents to the ED with vision changes and sensory changes.  Patient states over the last 4 days she has noticed that something is abnormal with the vision in her right eye.  She has a hard time seeing exactly what it is, however it does not seem to be normal for her.  She is still able to read at distance and has not noticed any double or significant blurry vision.  She denies any pain associated with this.  Denies associated headache.  She also states over the last 4 days she is noticed decree sensation throughout the entire right side of her body.  She states that this been constant since it began.  She has not noticed any weakness associated with this.  Denies any gait abnormalities.  She does state that she has a history of a dural venous sinus thrombosis back in 2022.  She was on Eliquis, however states that she followed up with her hematologist in September of last year and was told that it was optional at this point.  She stopped taking the Eliquis around September or October of last year.    ASSESSMENT / PLAN  (MEDICAL DECISION MAKING)     INITIAL VITALS: BP: (!) 141/101, Temp: 98 °F (36.7 °C), Pulse: 79, Respirations: 18, SpO2: 98 %    Linda Marino is a 44 y.o. female who presents to the ED with vision changes, sensory changes.  Vital signs stable on presentation remained stable throughout her stay.  Thorough history and physical exam performed.    Patient Zentz the emergency department with vision and sensory changes.  She states over the last 4 days she has noticed that the vision in her right eye seems abnormal.  She is unable to tell me

## 2025-04-14 ENCOUNTER — ANESTHESIA EVENT (OUTPATIENT)
Dept: MRI IMAGING | Age: 45
DRG: 103 | End: 2025-04-14
Payer: COMMERCIAL

## 2025-04-14 ENCOUNTER — APPOINTMENT (OUTPATIENT)
Dept: MRI IMAGING | Age: 45
DRG: 103 | End: 2025-04-14
Payer: COMMERCIAL

## 2025-04-14 ENCOUNTER — ANESTHESIA (OUTPATIENT)
Dept: MRI IMAGING | Age: 45
DRG: 103 | End: 2025-04-14
Payer: COMMERCIAL

## 2025-04-14 VITALS
DIASTOLIC BLOOD PRESSURE: 83 MMHG | HEART RATE: 74 BPM | OXYGEN SATURATION: 96 % | SYSTOLIC BLOOD PRESSURE: 134 MMHG | HEIGHT: 65 IN | BODY MASS INDEX: 31.99 KG/M2 | TEMPERATURE: 97.7 F | WEIGHT: 192 LBS | RESPIRATION RATE: 16 BRPM

## 2025-04-14 LAB
ALBUMIN SERPL-MCNC: 3.8 G/DL (ref 3.4–5)
ANION GAP SERPL CALCULATED.3IONS-SCNC: 7 MMOL/L (ref 3–16)
BASOPHILS # BLD: 0 K/UL (ref 0–0.2)
BASOPHILS NFR BLD: 0.6 %
BUN SERPL-MCNC: 9 MG/DL (ref 7–20)
CALCIUM SERPL-MCNC: 9.2 MG/DL (ref 8.3–10.6)
CHLORIDE SERPL-SCNC: 106 MMOL/L (ref 99–110)
CHOLEST SERPL-MCNC: 218 MG/DL (ref 0–199)
CO2 SERPL-SCNC: 25 MMOL/L (ref 21–32)
CREAT SERPL-MCNC: 0.6 MG/DL (ref 0.6–1.1)
DEPRECATED RDW RBC AUTO: 13.7 % (ref 12.4–15.4)
EKG ATRIAL RATE: 69 BPM
EKG DIAGNOSIS: NORMAL
EKG P AXIS: 40 DEGREES
EKG P-R INTERVAL: 166 MS
EKG Q-T INTERVAL: 402 MS
EKG QRS DURATION: 84 MS
EKG QTC CALCULATION (BAZETT): 430 MS
EKG R AXIS: -7 DEGREES
EKG T AXIS: 0 DEGREES
EKG VENTRICULAR RATE: 69 BPM
EOSINOPHIL # BLD: 0.2 K/UL (ref 0–0.6)
EOSINOPHIL NFR BLD: 3 %
EST. AVERAGE GLUCOSE BLD GHB EST-MCNC: 114 MG/DL
GFR SERPLBLD CREATININE-BSD FMLA CKD-EPI: >90 ML/MIN/{1.73_M2}
GLUCOSE SERPL-MCNC: 98 MG/DL (ref 70–99)
HBA1C MFR BLD: 5.6 %
HCT VFR BLD AUTO: 36.8 % (ref 36–48)
HDLC SERPL-MCNC: 62 MG/DL (ref 40–60)
HGB BLD-MCNC: 12.4 G/DL (ref 12–16)
LDLC SERPL CALC-MCNC: 130 MG/DL
LYMPHOCYTES # BLD: 2 K/UL (ref 1–5.1)
LYMPHOCYTES NFR BLD: 32.4 %
MCH RBC QN AUTO: 29.7 PG (ref 26–34)
MCHC RBC AUTO-ENTMCNC: 33.8 G/DL (ref 31–36)
MCV RBC AUTO: 87.8 FL (ref 80–100)
MONOCYTES # BLD: 0.4 K/UL (ref 0–1.3)
MONOCYTES NFR BLD: 6.1 %
NEUTROPHILS # BLD: 3.6 K/UL (ref 1.7–7.7)
NEUTROPHILS NFR BLD: 57.9 %
PHOSPHATE SERPL-MCNC: 3.4 MG/DL (ref 2.5–4.9)
PLATELET # BLD AUTO: 240 K/UL (ref 135–450)
PMV BLD AUTO: 9.6 FL (ref 5–10.5)
POTASSIUM SERPL-SCNC: 3.8 MMOL/L (ref 3.5–5.1)
RBC # BLD AUTO: 4.19 M/UL (ref 4–5.2)
SODIUM SERPL-SCNC: 138 MMOL/L (ref 136–145)
TRIGL SERPL-MCNC: 132 MG/DL (ref 0–150)
VLDLC SERPL CALC-MCNC: 26 MG/DL
WBC # BLD AUTO: 6.3 K/UL (ref 4–11)

## 2025-04-14 PROCEDURE — 80061 LIPID PANEL: CPT

## 2025-04-14 PROCEDURE — 6360000002 HC RX W HCPCS: Performed by: ANESTHESIOLOGY

## 2025-04-14 PROCEDURE — 93005 ELECTROCARDIOGRAM TRACING: CPT

## 2025-04-14 PROCEDURE — 2500000003 HC RX 250 WO HCPCS

## 2025-04-14 PROCEDURE — 85025 COMPLETE CBC W/AUTO DIFF WBC: CPT

## 2025-04-14 PROCEDURE — 70553 MRI BRAIN STEM W/O & W/DYE: CPT

## 2025-04-14 PROCEDURE — 6360000004 HC RX CONTRAST MEDICATION

## 2025-04-14 PROCEDURE — 83036 HEMOGLOBIN GLYCOSYLATED A1C: CPT

## 2025-04-14 PROCEDURE — G0378 HOSPITAL OBSERVATION PER HR: HCPCS

## 2025-04-14 PROCEDURE — 80069 RENAL FUNCTION PANEL: CPT

## 2025-04-14 PROCEDURE — 99223 1ST HOSP IP/OBS HIGH 75: CPT | Performed by: PSYCHIATRY & NEUROLOGY

## 2025-04-14 PROCEDURE — 72156 MRI NECK SPINE W/O & W/DYE: CPT

## 2025-04-14 PROCEDURE — 96374 THER/PROPH/DIAG INJ IV PUSH: CPT

## 2025-04-14 PROCEDURE — 6360000002 HC RX W HCPCS

## 2025-04-14 PROCEDURE — 6370000000 HC RX 637 (ALT 250 FOR IP): Performed by: HOSPITALIST

## 2025-04-14 PROCEDURE — A9576 INJ PROHANCE MULTIPACK: HCPCS

## 2025-04-14 PROCEDURE — 6370000000 HC RX 637 (ALT 250 FOR IP)

## 2025-04-14 PROCEDURE — 2580000003 HC RX 258

## 2025-04-14 PROCEDURE — 93010 ELECTROCARDIOGRAM REPORT: CPT | Performed by: INTERNAL MEDICINE

## 2025-04-14 PROCEDURE — 36415 COLL VENOUS BLD VENIPUNCTURE: CPT

## 2025-04-14 RX ORDER — ONDANSETRON 2 MG/ML
4 INJECTION INTRAMUSCULAR; INTRAVENOUS EVERY 6 HOURS PRN
Status: DISCONTINUED | OUTPATIENT
Start: 2025-04-14 | End: 2025-04-14

## 2025-04-14 RX ORDER — SODIUM CHLORIDE 0.9 % (FLUSH) 0.9 %
5-40 SYRINGE (ML) INJECTION PRN
Status: DISCONTINUED | OUTPATIENT
Start: 2025-04-14 | End: 2025-04-14 | Stop reason: HOSPADM

## 2025-04-14 RX ORDER — FENTANYL CITRATE 50 UG/ML
25 INJECTION, SOLUTION INTRAMUSCULAR; INTRAVENOUS EVERY 5 MIN PRN
Status: DISCONTINUED | OUTPATIENT
Start: 2025-04-14 | End: 2025-04-14 | Stop reason: HOSPADM

## 2025-04-14 RX ORDER — LORAZEPAM 2 MG/ML
0.5 INJECTION INTRAMUSCULAR
Status: DISCONTINUED | OUTPATIENT
Start: 2025-04-14 | End: 2025-04-14 | Stop reason: HOSPADM

## 2025-04-14 RX ORDER — OXYCODONE HYDROCHLORIDE 5 MG/1
5 TABLET ORAL
Status: DISCONTINUED | OUTPATIENT
Start: 2025-04-14 | End: 2025-04-14 | Stop reason: HOSPADM

## 2025-04-14 RX ORDER — LIDOCAINE HYDROCHLORIDE 20 MG/ML
INJECTION, SOLUTION EPIDURAL; INFILTRATION; INTRACAUDAL; PERINEURAL
Status: DISCONTINUED | OUTPATIENT
Start: 2025-04-14 | End: 2025-04-14 | Stop reason: SDUPTHER

## 2025-04-14 RX ORDER — DEXAMETHASONE SODIUM PHOSPHATE 4 MG/ML
INJECTION, SOLUTION INTRA-ARTICULAR; INTRALESIONAL; INTRAMUSCULAR; INTRAVENOUS; SOFT TISSUE
Status: DISCONTINUED | OUTPATIENT
Start: 2025-04-14 | End: 2025-04-14 | Stop reason: SDUPTHER

## 2025-04-14 RX ORDER — LABETALOL HYDROCHLORIDE 5 MG/ML
10 INJECTION, SOLUTION INTRAVENOUS
Status: DISCONTINUED | OUTPATIENT
Start: 2025-04-14 | End: 2025-04-14 | Stop reason: HOSPADM

## 2025-04-14 RX ORDER — SODIUM CHLORIDE, SODIUM LACTATE, POTASSIUM CHLORIDE, CALCIUM CHLORIDE 600; 310; 30; 20 MG/100ML; MG/100ML; MG/100ML; MG/100ML
INJECTION, SOLUTION INTRAVENOUS
Status: DISCONTINUED | OUTPATIENT
Start: 2025-04-14 | End: 2025-04-14 | Stop reason: SDUPTHER

## 2025-04-14 RX ORDER — 0.9 % SODIUM CHLORIDE 0.9 %
500 INTRAVENOUS SOLUTION INTRAVENOUS ONCE
Status: COMPLETED | OUTPATIENT
Start: 2025-04-14 | End: 2025-04-14

## 2025-04-14 RX ORDER — ONDANSETRON 2 MG/ML
4 INJECTION INTRAMUSCULAR; INTRAVENOUS
Status: COMPLETED | OUTPATIENT
Start: 2025-04-14 | End: 2025-04-14

## 2025-04-14 RX ORDER — PROCHLORPERAZINE EDISYLATE 5 MG/ML
5 INJECTION INTRAMUSCULAR; INTRAVENOUS
Status: DISCONTINUED | OUTPATIENT
Start: 2025-04-14 | End: 2025-04-14 | Stop reason: HOSPADM

## 2025-04-14 RX ORDER — ROCURONIUM BROMIDE 10 MG/ML
INJECTION, SOLUTION INTRAVENOUS
Status: DISCONTINUED | OUTPATIENT
Start: 2025-04-14 | End: 2025-04-14 | Stop reason: SDUPTHER

## 2025-04-14 RX ORDER — ONDANSETRON 2 MG/ML
INJECTION INTRAMUSCULAR; INTRAVENOUS
Status: DISCONTINUED | OUTPATIENT
Start: 2025-04-14 | End: 2025-04-14 | Stop reason: SDUPTHER

## 2025-04-14 RX ORDER — HYDROMORPHONE HYDROCHLORIDE 1 MG/ML
0.5 INJECTION, SOLUTION INTRAMUSCULAR; INTRAVENOUS; SUBCUTANEOUS EVERY 5 MIN PRN
Status: DISCONTINUED | OUTPATIENT
Start: 2025-04-14 | End: 2025-04-14 | Stop reason: HOSPADM

## 2025-04-14 RX ORDER — SODIUM CHLORIDE 9 MG/ML
INJECTION, SOLUTION INTRAVENOUS PRN
Status: DISCONTINUED | OUTPATIENT
Start: 2025-04-14 | End: 2025-04-14 | Stop reason: HOSPADM

## 2025-04-14 RX ORDER — MEPERIDINE HYDROCHLORIDE 25 MG/ML
12.5 INJECTION INTRAMUSCULAR; INTRAVENOUS; SUBCUTANEOUS EVERY 5 MIN PRN
Status: DISCONTINUED | OUTPATIENT
Start: 2025-04-14 | End: 2025-04-14 | Stop reason: HOSPADM

## 2025-04-14 RX ORDER — ATORVASTATIN CALCIUM 80 MG/1
80 TABLET, FILM COATED ORAL NIGHTLY
Qty: 30 TABLET | Refills: 3 | Status: CANCELLED | OUTPATIENT
Start: 2025-04-14

## 2025-04-14 RX ORDER — NALOXONE HYDROCHLORIDE 0.4 MG/ML
INJECTION, SOLUTION INTRAMUSCULAR; INTRAVENOUS; SUBCUTANEOUS PRN
Status: DISCONTINUED | OUTPATIENT
Start: 2025-04-14 | End: 2025-04-14 | Stop reason: HOSPADM

## 2025-04-14 RX ORDER — SODIUM CHLORIDE 0.9 % (FLUSH) 0.9 %
5-40 SYRINGE (ML) INJECTION EVERY 12 HOURS SCHEDULED
Status: DISCONTINUED | OUTPATIENT
Start: 2025-04-14 | End: 2025-04-14 | Stop reason: HOSPADM

## 2025-04-14 RX ORDER — IPRATROPIUM BROMIDE AND ALBUTEROL SULFATE 2.5; .5 MG/3ML; MG/3ML
1 SOLUTION RESPIRATORY (INHALATION)
Status: DISCONTINUED | OUTPATIENT
Start: 2025-04-14 | End: 2025-04-14 | Stop reason: HOSPADM

## 2025-04-14 RX ORDER — PROPOFOL 10 MG/ML
INJECTION, EMULSION INTRAVENOUS
Status: DISCONTINUED | OUTPATIENT
Start: 2025-04-14 | End: 2025-04-14 | Stop reason: SDUPTHER

## 2025-04-14 RX ORDER — MIDAZOLAM HYDROCHLORIDE 1 MG/ML
INJECTION, SOLUTION INTRAMUSCULAR; INTRAVENOUS
Status: DISCONTINUED | OUTPATIENT
Start: 2025-04-14 | End: 2025-04-14 | Stop reason: SDUPTHER

## 2025-04-14 RX ORDER — DIPHENHYDRAMINE HYDROCHLORIDE 50 MG/ML
12.5 INJECTION, SOLUTION INTRAMUSCULAR; INTRAVENOUS
Status: DISCONTINUED | OUTPATIENT
Start: 2025-04-14 | End: 2025-04-14 | Stop reason: HOSPADM

## 2025-04-14 RX ADMIN — PROPOFOL 200 MG: 10 INJECTION, EMULSION INTRAVENOUS at 09:46

## 2025-04-14 RX ADMIN — DEXAMETHASONE SODIUM PHOSPHATE 4 MG: 4 INJECTION INTRA-ARTICULAR; INTRALESIONAL; INTRAMUSCULAR; INTRAVENOUS; SOFT TISSUE at 09:50

## 2025-04-14 RX ADMIN — BENZOCAINE 6 MG-MENTHOL 10 MG LOZENGES 1 LOZENGE: at 12:40

## 2025-04-14 RX ADMIN — ROCURONIUM BROMIDE 50 MG: 10 INJECTION, SOLUTION INTRAVENOUS at 09:46

## 2025-04-14 RX ADMIN — SUGAMMADEX 200 MG: 100 INJECTION, SOLUTION INTRAVENOUS at 10:50

## 2025-04-14 RX ADMIN — SODIUM CHLORIDE, PRESERVATIVE FREE 10 ML: 5 INJECTION INTRAVENOUS at 08:17

## 2025-04-14 RX ADMIN — GADOTERIDOL 20 ML: 279.3 INJECTION, SOLUTION INTRAVENOUS at 11:07

## 2025-04-14 RX ADMIN — APIXABAN 2.5 MG: 2.5 TABLET, FILM COATED ORAL at 08:17

## 2025-04-14 RX ADMIN — LIDOCAINE HYDROCHLORIDE 100 MG: 20 INJECTION, SOLUTION EPIDURAL; INFILTRATION; INTRACAUDAL; PERINEURAL at 09:46

## 2025-04-14 RX ADMIN — ONDANSETRON 4 MG: 2 INJECTION, SOLUTION INTRAMUSCULAR; INTRAVENOUS at 09:50

## 2025-04-14 RX ADMIN — MIDAZOLAM HYDROCHLORIDE 2 MG: 1 INJECTION, SOLUTION INTRAMUSCULAR; INTRAVENOUS at 09:41

## 2025-04-14 RX ADMIN — SODIUM CHLORIDE 500 ML: 0.9 INJECTION, SOLUTION INTRAVENOUS at 06:49

## 2025-04-14 RX ADMIN — ONDANSETRON 4 MG: 2 INJECTION, SOLUTION INTRAMUSCULAR; INTRAVENOUS at 11:40

## 2025-04-14 RX ADMIN — SODIUM CHLORIDE, PRESERVATIVE FREE 10 ML: 5 INJECTION INTRAVENOUS at 00:15

## 2025-04-14 RX ADMIN — SODIUM CHLORIDE, SODIUM LACTATE, POTASSIUM CHLORIDE, AND CALCIUM CHLORIDE: .6; .31; .03; .02 INJECTION, SOLUTION INTRAVENOUS at 09:40

## 2025-04-14 ASSESSMENT — LIFESTYLE VARIABLES: SMOKING_STATUS: 0

## 2025-04-14 NOTE — CARE COORDINATION
Case Management Assessment  Initial Evaluation    Date/Time of Evaluation: 4/14/2025 1:44 PM  Assessment Completed by: Martha Disla RN    If patient is discharged prior to next notation, then this note serves as note for discharge by case management.    Patient Name: Linda Marino                   YOB: 1980  Diagnosis: Alterations of sensations [R20.9]  Vision changes [H53.9]  Visual changes [H53.9]                   Date / Time: 4/13/2025  5:03 PM    Patient Admission Status: Inpatient   Readmission Risk (Low < 19, Mod (19-27), High > 27): Readmission Risk Score: 5.6    Current PCP: Larry James, DO  PCP verified by CM? No    Chart Reviewed: Yes      History Provided by: Patient  Patient Orientation: Alert and Oriented    Patient Cognition: Alert    Hospitalization in the last 30 days (Readmission):  No    If yes, Readmission Assessment in CM Navigator will be completed.    Advance Directives:      Code Status: Full Code   Patient's Primary Decision Maker is: Legal Next of Kin      Discharge Planning:    Patient lives with: Children Type of Home: House  Primary Care Giver: Self  Patient Support Systems include: Children   Current Financial resources: None  Current community resources: None  Current services prior to admission: None            Current DME:              Type of Home Care services:  None    ADLS  Prior functional level: Independent in ADLs/IADLs  Current functional level: Independent in ADLs/IADLs    PT AM-PAC:   /24  OT AM-PAC:   /24    Family can provide assistance at DC: Yes  Would you like Case Management to discuss the discharge plan with any other family members/significant others, and if so, who? No  Plans to Return to Present Housing: Yes  Other Identified Issues/Barriers to RETURNING to current housing: none  Potential Assistance needed at discharge: N/A            Potential DME:    Patient expects to discharge to: House  Plan for transportation at discharge:

## 2025-04-14 NOTE — PROGRESS NOTES
4 Eyes Skin Assessment     NAME:  Linda Marino  YOB: 1980  MEDICAL RECORD NUMBER:  [id number]    The patient is being assessed for  Admission    I agree that at least one RN has performed a thorough Head to Toe Skin Assessment on the patient. ALL assessment sites listed below have been assessed.      Areas assessed by both nurses:    Head, Face, Ears, Shoulders, Back, Chest, Arms, Elbows, Hands, Sacrum. Buttock, Coccyx, Ischium, and Legs. Feet and Heels        Does the Patient have a Wound? No noted wound(s)       Franco Prevention initiated by RN: No  Wound Care Orders initiated by RN: No    Pressure Injury (Stage 3,4, Unstageable, DTI, NWPT, and Complex wounds) if present, place Wound referral order by RN under : No    New Ostomies, if present place, Ostomy referral order under : No     Nurse 1 eSignature: Electronically signed by Irene Dyer RN on 4/14/25 at 12:14 AM EDT    **SHARE this note so that the co-signing nurse can place an eSignature**    Nurse 2 eSignature: Electronically signed by Juany Whiteside RN on 4/14/25 at 12:14 AM EDT

## 2025-04-14 NOTE — ANESTHESIA PRE PROCEDURE
Department of Anesthesiology  Preprocedure Note       Name:  Linda Marino   Age:  44 y.o.  :  1980                                          MRN:  [id number]         Date:  2025      Surgeon: * No surgeons listed *    Procedure: * No procedures listed *    Medications prior to admission:   Prior to Admission medications    Medication Sig Start Date End Date Taking? Authorizing Provider   ondansetron (ZOFRAN-ODT) 4 MG disintegrating tablet Take 1 tablet by mouth 3 times daily as needed for Nausea or Vomiting 24  Yes Donna Howard APRN - CNP   ferrous sulfate (IRON 325) 325 (65 Fe) MG tablet Take 1 tablet by mouth daily (with breakfast)   Yes ProviderSolis MD   apixaban (ELIQUIS) 5 MG TABS tablet Take 0.5 tablets by mouth 2 times daily   Yes Provider, MD [facility]   norgestimate-ethinyl estradiol (SOFIA) 0.25-35 MG-MCG per tablet Take 1 tablet by mouth daily  22  Provider, MD [facility]       Current medications:    Current Facility-Administered Medications   Medication Dose Route Frequency Provider Last Rate Last Admin   • apixaban (ELIQUIS) tablet 2.5 mg  2.5 mg Oral BID Shivani Bello,        • sodium chloride flush 0.9 % injection 5-40 mL  5-40 mL IntraVENous 2 times per day Shivani Bello DO   10 mL at 25 0015   • sodium chloride flush 0.9 % injection 5-40 mL  5-40 mL IntraVENous PRN Shivani Bello DO       • 0.9 % sodium chloride infusion   IntraVENous PRN Shivani Bello DO       • potassium chloride (KLOR-CON M) extended release tablet 40 mEq  40 mEq Oral PRN Shivani Bello DO        Or   • potassium bicarb-citric acid (EFFER-K) effervescent tablet 40 mEq  40 mEq Oral PRN Shivani Bello DO        Or   • potassium chloride 10 mEq/100 mL IVPB (Peripheral Line)  10 mEq IntraVENous PRN Shivani Bello DO       • magnesium sulfate 2000 mg in 50 mL IVPB premix  2,000 mg IntraVENous PRN Shivani Bello DO       • ondansetron (ZOFRAN-ODT) disintegrating tablet 4 mg  4 mg Oral Q8H PRN

## 2025-04-14 NOTE — CONSULTS
Neurology Consultation Note      Patient: Linda Marino MRN: [id number]    YOB: 1980  Age: 44 y.o.  Sex: female   Unit: [facility] 5T ORTHO/NEURO Room/Bed: 5525/5525-01 Location: [facility]    Date of Consultation: 4/14/2025  Date of Admission: 4/13/2025  5:03 PM ( LOS: 1 day )  Admitting Physician: ARGELIA NAYLOR    Primary Care Physician: Larry James DO   Consult Requested By: Shivani Bello DO     Reason for Consult: \"Previous Dural venous thrombosis in 2022. Not on anticoagulation, possible new stroke.\"    ASSESSMENT & RECOMMENDATIONS     Assessment  43yo woman with prior sub-occlusive left transverse & sigmoid venous sinus thrombosis in 2022 who presented to ER with decreased sensation in right face, arm, and leg along with blurred vision, reminiscent of symptoms she had at the onset of her initial VST, with unremarkable MRI brain and CTV that shows improvement in overall VST  She stopped her apixaban in October after consultation with her hematologist  Do not feel that her symptoms are compelling for worsening of her VST, trey in light of apparent improvement on CTV and negative MRI brain  She states she was told at the time she came off the apixaban in October that it was her choice whether to come off of it or continue it; she chose to stop it  She would prefer to be back on it now and, since these symptoms cannot be completely excluded as being due to VST, would go ahead and restart apixaban  She has appt to see hematologist in about 5 months and so will stay on apixaban until she follows up with them    Recommendations  Restart apixaban and follow-up with hematology at regularly scheduled appt in 5 months  No further in-patient neurologic workup indicated; will sign off; call with questions.       SUBJECTIVE     Chief Complaint:   \"My right side was numb and my vision was blurry\"    History of Present Illness:  Linda Marino is a 44 y.o. woman with sub-occlusive left

## 2025-04-14 NOTE — DISCHARGE SUMMARY
INTERNAL MEDICINE DEPARTMENT AT THE [facility]  DISCHARGE SUMMARY    Patient ID: Linda Marino                                             Discharge Date: 4/14/2025   Patient's PCP: Larry James DO                                          Discharge Physician: Nicci Pritchard MD MD  Admit Date: 4/13/2025   Admitting Physician: Ana Phelps MD    PROBLEMS DURING HOSPITALIZATION:  Present on Admission:   Visual changes      DISCHARGE DIAGNOSES:  Migraine with Aura   Chronic Venous Dural Sinus Thrombosis     HPI:  Ms. Marino is a 45 yo female with pmhx of Dural venous thrombosis, perforated diverticulitis who presents to the ED for chief complaint of blurry vision and decreased sensation on the right side of her body for 4 days duration. In 2022 patient had a dural venous thrombosis with similar presenting symptoms which prompted her initial visit to the hospital. She has been following with hematology since this and has been on Eliquis since October of 2024 after her hematologist gave her the option of discontinuing it because her hypercoagulable workup was negative and patient had no further clots. It was deemed that the thrombosis was likely due to the OCPs that patient was on. She endorses that she has been on OCPs in the past when she was younger with no troubles. Since the thrombosis she has had mild sensation deficits in her right arm but she presented to the ED because the deficits spread to her whole right side. She has also had a mild occipital headache for the past few days that was similar to when she had her thrombosis in 2022.      On Presentation,  BP: (!) 141/101, Temp: 98 °F (36.7 °C), Pulse: 79, Respirations: 18, SpO2: 98 %      ED Course:  Labs: Unremarkable   Imaging: CTA venous : Findings suggesting sequelae of chronic dural venous sinus sclerosis involving the sigmoid sinus on the left.    The following issues were addressed during hospitalization:  Migraine with Aura, Suspected    Patient

## 2025-04-14 NOTE — PROGRESS NOTES
PACU Transfer Note    Vitals:    04/14/25 1140   BP:    Pulse: 85   Resp: 18   Temp:    SpO2: 99%       In: 605 [I.V.:605]  Out: -     Pain assessment:  none       Report given to Receiving unit RN.    4/14/2025 12:03 PM

## 2025-04-14 NOTE — PROGRESS NOTES
Current Physical Exam as follows:    AOX4    CN II - VII intact    Motor: 5/5 strength upper and lower extremity    Sensation: 4/5 sensation on right side of body. 5/5 sensation on left.    Coordination intact

## 2025-04-14 NOTE — PLAN OF CARE
Problem: Safety - Adult  Goal: Free from fall injury  Outcome: Progressing   All fall precautions in place. Bed locked and in lowest position with alarm on. Overbed table and personal belonings within reach. Call light within reach and patient instructed to use call light for assistance. Non-skid socks on.    Problem: Skin/Tissue Integrity  Goal: Skin integrity remains intact  Description: 1.  Monitor for areas of redness and/or skin breakdown2.  Assess vascular access sites hourly3.  Every 4-6 hours minimum:  Change oxygen saturation probe site4.  Every 4-6 hours:  If on nasal continuous positive airway pressure, respiratory therapy assess nares and determine need for appliance change or resting period  Outcome: Progressing   Skin assessed this shift. Maria Del Carmen care completed as necessary. Patient alternating positions to reduce pressure and prevent skin injury q2 and as needed.

## 2025-04-14 NOTE — DISCHARGE INSTRUCTIONS
Medications    We did not prescribe you any new medications, or make any adjustments to your home medications.     Follow-Ups    Please call your PCP for a follow up appointment post-hospital discharge  Please call you Hematologist, Maricel Higginbotham, , to ask about acquiring an appointment sooner   Please call to make an appointment and follow up with an outpatient Neurologist regarding you symptoms. See details in the follow-up section     Return Precautions    - if you symptoms get worse or something changes, please return to the nearest emergency department.

## 2025-04-14 NOTE — PROGRESS NOTES
Pt to d/c to home. 1 PIV removed. D/c packet fully reviewed with Pt with emphasis on f/u appointments, medications, and BEFAST, Pt verbalized understanding. All belonging with Pt.

## 2025-04-14 NOTE — PROGRESS NOTES
Patient is alert and oriented x4. VSS on room air. Medications given per MAR, no side effects noted. Patient ambulating x1 with gait belt and walker. No complaints of pain, continuing to monitor and manage per MAR. Voiding well via BRP, x0 bm.     Discharge paperwork reviewed. All questions answered. PT discharged to home.

## 2025-04-14 NOTE — PLAN OF CARE
Problem: Discharge Planning  Goal: Discharge to home or other facility with appropriate resources  Outcome: Progressing  Flowsheets (Taken 4/14/2025 0819)  Discharge to home or other facility with appropriate resources: Identify barriers to discharge with patient and caregiver   Pt involved in discharge planning. Barriers to discharge discussed with patient. Discharge learning needs identified. Discuss with patient any additional needed resources and transportation plans. Case management following plan of care.      Problem: Safety - Adult  Goal: Free from fall injury  4/14/2025 1022 by Bri Bell, RN  Outcome: Progressing  All fall precautions in place. Bed locked and in lowest position with alarm on. Overbed table and personal belonings within reach. Call light within reach and patient instructed to use call light for assistance. Non-skid socks on.      Problem: Skin/Tissue Integrity  Goal: Skin integrity remains intact  Description: 1.  Monitor for areas of redness and/or skin breakdown2.  Assess vascular access sites hourly3.  Every 4-6 hours minimum:  Change oxygen saturation probe site4.  Every 4-6 hours:  If on nasal continuous positive airway pressure, respiratory therapy assess nares and determine need for appliance change or resting period  4/14/2025 1022 by Bri Bell, RN  Outcome: Progressing  Flowsheets (Taken 4/14/2025 0819)  Skin Integrity Remains Intact: Monitor for areas of redness and/or skin breakdown

## 2025-04-14 NOTE — H&P
Internal Medicine H&P    Date: 2025   Patient: Linda Marino   Patient : 1980     CC: Numbness (Pt reports numbness x4 days on the right side of her body. ) and Eye Problem (Right sided vision blurry for 4 days days ago.)       Subjective     HPI: Ms. Marino is a 45 yo female with pmhx of Dural venous thrombosis, perforated diverticulitis who presents to the ED for chief complaint of blurry vision and decreased sensation on the right side of her body for 4 days duration. In  patient had a dural venous thrombosis with similar presenting symptoms which prompted her initial visit to the hospital. She has been following with hematology since this and has been on Eliquis since 2024 after her hematologist gave her the option of discontinuing it because her hypercoagulable workup was negative and patient had no further clots. It was deemed that the thrombosis was likely due to the OCPs that patient was on. She endorses that she has been on OCPs in the past when she was younger with no troubles. Since the thrombosis she has had mild sensation deficits in her right arm but she presented to the ED because the deficits spread to her whole right side. She has also had a mild occipital headache for the past few days that was similar to when she had her thrombosis in .     On Presentation,  BP: (!) 141/101, Temp: 98 °F (36.7 °C), Pulse: 79, Respirations: 18, SpO2: 98 %     ED Course:  Labs: Unremarkable   Imaging: CTA venous : Findings suggesting sequelae of chronic dural venous sinus sclerosis involving the sigmoid sinus on the left.    PMHx:      Diagnosis Date    Bacterial vaginosis 2019    Diverticulitis        PSurgHx:      Procedure Laterality Date     SECTION      x3    TUBAL LIGATION          Medication:  Not in a hospital admission.    Allergies:  Codeine    SocHx:  Social History     Socioeconomic History    Marital status: Single     Spouse name: None    Number of

## 2025-04-14 NOTE — PROGRESS NOTES
Internal Medicine Progress Note    Date: 4/14/2025   Patient: Linda Marino  Admitted: 4/13/2025   Hospital Day: 1      CC: Right sided numbness and vision changes    Interval Hx   Ms. Marino in a 44 y.o. female who presented to the ED on 4/14 with 4 days of right sided numbness and right sided vision changes.   - Pt was seen lying in bed in no acute distress. VSS. HDS.   - Complaints of mild sensory weakness in the right upper and lower extremity that is equal. Endorses right sided vision blurring that worsens upon covering L eye. No associated photophobia, headache, diplopia, or intranuclear ophthalmoplegia.   - Complaints of mild occipital headache waxing and waning.   - No electrolyte abnormalities noted. CTA negative    Brief Daily Plan:   - MRI brain and cervical spine w w/o contrast pending, with anesthesia   - Neurology consulted      Objective     Vital Signs:  Patient Vitals for the past 8 hrs:   BP Temp Temp src Pulse Resp SpO2   04/14/25 0445 99/65 98 °F (36.7 °C) Oral 70 16 97 %       Physical Exam  Constitutional:       Appearance: Normal appearance.   Eyes:      Extraocular Movements: Extraocular movements intact.      Conjunctiva/sclera: Conjunctivae normal.      Pupils: Pupils are equal, round, and reactive to light.   Cardiovascular:      Rate and Rhythm: Normal rate and regular rhythm.      Pulses: Normal pulses.      Heart sounds: Normal heart sounds.   Pulmonary:      Effort: Pulmonary effort is normal.      Breath sounds: Normal breath sounds.   Musculoskeletal:         General: No swelling, tenderness or signs of injury. Normal range of motion.      Cervical back: No tenderness.   Skin:     General: Skin is warm and dry.   Neurological:      General: No focal deficit present.      Mental Status: She is alert and oriented to person, place, and time.      Sensory: Sensory deficit (mild sensory weakness on RUE & RLE) present.      Motor: No weakness.      Coordination: Coordination normal.

## 2025-04-14 NOTE — ED NOTES
Patient Name: Linda Marino  : 1980 44 y.o.  MRN: [id number]  ED Room #: A03/A03-03     Chief complaint:   Chief Complaint   Patient presents with    Numbness     Pt reports numbness x4 days on the right side of her body.     Eye Problem     Right sided vision blurry for 4 days days ago.     Hospital Problem/Diagnosis:       O2 Flow Rate:O2 Device: None (Room air)   (if applicable)  Cardiac Rhythm:   (if applicable)  Active LDA's:   Peripheral IV 25 Left Antecubital (Active)            How does patient ambulate? Low Fall Risk (Ambulates by themselves without support    2. How does patient take pills? Whole with Water    3. Is patient alert? Alert    4. Is patient oriented? To Person, To Place, To Time, and To Situation    5.   Patient arrived from:  home  Facility Name: ___________________________________________    6. If patient is disoriented or from a [facility] Nursing Facility has family been notified of admission? No    7. Patient belongings? Belongings: Cell Phone, Wallet, and Clothing    Disposition of belongings? Kept with Patient     8. Any specific patient or family belongings/needs/dynamics?   a. None    9. Miscellaneous comments/pending orders?  a. Due to the vision changes and altered sensation the patient is being admitted for a MRI tomorrow. Patient is A&O x3 and up ad zac. Left AC 22g IV.      If there are any additional questions please reach out to the Emergency Department.      Rufino Spring RN  [phone]

## 2025-04-14 NOTE — ANESTHESIA POSTPROCEDURE EVALUATION
Department of Anesthesiology  Postprocedure Note    Patient: Linda Marino  MRN: [id number]  YOB: 1980  Date of evaluation: 4/14/2025    Procedure Summary       Date: 04/14/25 Room / Location: [facility]    Anesthesia Start: 0943 Anesthesia Stop: 1107    Procedure: MRI BRAIN W WO CONTRAST Diagnosis: (Possible stroke)    Scheduled Providers:  Responsible Provider: Brett Morales MD    Anesthesia Type: general ASA Status: 2            Anesthesia Type: No value filed.    Dakota Phase I: Dakota Score: 9    Dakota Phase II:      Anesthesia Post Evaluation    Patient location during evaluation: PACU  Patient participation: complete - patient participated  Level of consciousness: awake  Airway patency: patent  Nausea & Vomiting: no nausea and no vomiting  Cardiovascular status: blood pressure returned to baseline and hemodynamically stable  Respiratory status: acceptable  Hydration status: euvolemic  Multimodal analgesia pain management approach  Pain management: adequate    No notable events documented.   Price (Do Not Change): 0.00 Instructions: This plan will send the code FBSE to the PM system.  DO NOT or CHANGE the price. Detail Level: Generalized